# Patient Record
Sex: FEMALE | Race: WHITE | URBAN - METROPOLITAN AREA
[De-identification: names, ages, dates, MRNs, and addresses within clinical notes are randomized per-mention and may not be internally consistent; named-entity substitution may affect disease eponyms.]

---

## 2017-12-19 ENCOUNTER — INPATIENT (INPATIENT)
Facility: HOSPITAL | Age: 57
LOS: 2 days | Discharge: HOME | DRG: 445 | End: 2017-12-22
Attending: SURGERY | Admitting: SURGERY
Payer: COMMERCIAL

## 2017-12-19 VITALS
OXYGEN SATURATION: 96 % | DIASTOLIC BLOOD PRESSURE: 75 MMHG | HEART RATE: 74 BPM | HEIGHT: 62 IN | WEIGHT: 259.93 LBS | TEMPERATURE: 99 F | SYSTOLIC BLOOD PRESSURE: 142 MMHG | RESPIRATION RATE: 18 BRPM

## 2017-12-19 DIAGNOSIS — Z90.49 ACQUIRED ABSENCE OF OTHER SPECIFIED PARTS OF DIGESTIVE TRACT: Chronic | ICD-10-CM

## 2017-12-19 DIAGNOSIS — R11.2 NAUSEA WITH VOMITING, UNSPECIFIED: ICD-10-CM

## 2017-12-19 DIAGNOSIS — Z88.0 ALLERGY STATUS TO PENICILLIN: ICD-10-CM

## 2017-12-19 DIAGNOSIS — I25.2 OLD MYOCARDIAL INFARCTION: ICD-10-CM

## 2017-12-19 DIAGNOSIS — J98.11 ATELECTASIS: ICD-10-CM

## 2017-12-19 DIAGNOSIS — M17.0 BILATERAL PRIMARY OSTEOARTHRITIS OF KNEE: ICD-10-CM

## 2017-12-19 DIAGNOSIS — M79.7 FIBROMYALGIA: ICD-10-CM

## 2017-12-19 DIAGNOSIS — Z87.891 PERSONAL HISTORY OF NICOTINE DEPENDENCE: ICD-10-CM

## 2017-12-19 DIAGNOSIS — R79.89 OTHER SPECIFIED ABNORMAL FINDINGS OF BLOOD CHEMISTRY: ICD-10-CM

## 2017-12-19 DIAGNOSIS — R74.0 NONSPECIFIC ELEVATION OF LEVELS OF TRANSAMINASE AND LACTIC ACID DEHYDROGENASE [LDH]: ICD-10-CM

## 2017-12-19 DIAGNOSIS — E03.8 OTHER SPECIFIED HYPOTHYROIDISM: ICD-10-CM

## 2017-12-19 DIAGNOSIS — E03.9 HYPOTHYROIDISM, UNSPECIFIED: ICD-10-CM

## 2017-12-19 DIAGNOSIS — E66.01 MORBID (SEVERE) OBESITY DUE TO EXCESS CALORIES: ICD-10-CM

## 2017-12-19 DIAGNOSIS — B19.20 UNSPECIFIED VIRAL HEPATITIS C WITHOUT HEPATIC COMA: ICD-10-CM

## 2017-12-19 DIAGNOSIS — M06.9 RHEUMATOID ARTHRITIS, UNSPECIFIED: ICD-10-CM

## 2017-12-19 DIAGNOSIS — G56.00 CARPAL TUNNEL SYNDROME, UNSPECIFIED UPPER LIMB: ICD-10-CM

## 2017-12-19 DIAGNOSIS — M41.20 OTHER IDIOPATHIC SCOLIOSIS, SITE UNSPECIFIED: ICD-10-CM

## 2017-12-19 DIAGNOSIS — G62.9 POLYNEUROPATHY, UNSPECIFIED: ICD-10-CM

## 2017-12-19 DIAGNOSIS — N20.0 CALCULUS OF KIDNEY: ICD-10-CM

## 2017-12-19 DIAGNOSIS — R10.9 UNSPECIFIED ABDOMINAL PAIN: ICD-10-CM

## 2017-12-19 DIAGNOSIS — G43.909 MIGRAINE, UNSPECIFIED, NOT INTRACTABLE, WITHOUT STATUS MIGRAINOSUS: ICD-10-CM

## 2017-12-19 DIAGNOSIS — I25.10 ATHEROSCLEROTIC HEART DISEASE OF NATIVE CORONARY ARTERY WITHOUT ANGINA PECTORIS: ICD-10-CM

## 2017-12-19 DIAGNOSIS — K80.70 CALCULUS OF GALLBLADDER AND BILE DUCT WITHOUT CHOLECYSTITIS WITHOUT OBSTRUCTION: ICD-10-CM

## 2017-12-19 DIAGNOSIS — N13.2 HYDRONEPHROSIS WITH RENAL AND URETERAL CALCULOUS OBSTRUCTION: ICD-10-CM

## 2017-12-19 LAB
ALBUMIN SERPL ELPH-MCNC: 3.1 G/DL — LOW (ref 3.3–5)
ALP SERPL-CCNC: 593 U/L — HIGH (ref 40–120)
ALT FLD-CCNC: 374 U/L — HIGH (ref 12–78)
ANION GAP SERPL CALC-SCNC: 9 MMOL/L — SIGNIFICANT CHANGE UP (ref 5–17)
AST SERPL-CCNC: 758 U/L — HIGH (ref 15–37)
BASOPHILS # BLD AUTO: 0.1 K/UL — SIGNIFICANT CHANGE UP (ref 0–0.2)
BASOPHILS NFR BLD AUTO: 0.4 % — SIGNIFICANT CHANGE UP (ref 0–2)
BILIRUB SERPL-MCNC: 2.9 MG/DL — HIGH (ref 0.2–1.2)
BUN SERPL-MCNC: 22 MG/DL — SIGNIFICANT CHANGE UP (ref 7–23)
CALCIUM SERPL-MCNC: 9.5 MG/DL — SIGNIFICANT CHANGE UP (ref 8.5–10.1)
CHLORIDE SERPL-SCNC: 98 MMOL/L — SIGNIFICANT CHANGE UP (ref 96–108)
CO2 SERPL-SCNC: 27 MMOL/L — SIGNIFICANT CHANGE UP (ref 22–31)
CREAT SERPL-MCNC: 1.6 MG/DL — HIGH (ref 0.5–1.3)
EOSINOPHIL # BLD AUTO: 0 K/UL — SIGNIFICANT CHANGE UP (ref 0–0.5)
EOSINOPHIL NFR BLD AUTO: 0.3 % — SIGNIFICANT CHANGE UP (ref 0–6)
GLUCOSE SERPL-MCNC: 209 MG/DL — HIGH (ref 70–99)
HCG SERPL-ACNC: <1 MIU/ML — SIGNIFICANT CHANGE UP
HCT VFR BLD CALC: 43.4 % — SIGNIFICANT CHANGE UP (ref 34.5–45)
HGB BLD-MCNC: 13.7 G/DL — SIGNIFICANT CHANGE UP (ref 11.5–15.5)
LIDOCAIN IGE QN: 322 U/L — SIGNIFICANT CHANGE UP (ref 73–393)
LYMPHOCYTES # BLD AUTO: 0.8 K/UL — LOW (ref 1–3.3)
LYMPHOCYTES # BLD AUTO: 6.6 % — LOW (ref 13–44)
MCHC RBC-ENTMCNC: 30.3 PG — SIGNIFICANT CHANGE UP (ref 27–34)
MCHC RBC-ENTMCNC: 31.5 GM/DL — LOW (ref 32–36)
MCV RBC AUTO: 96 FL — SIGNIFICANT CHANGE UP (ref 80–100)
MONOCYTES # BLD AUTO: 0.5 K/UL — SIGNIFICANT CHANGE UP (ref 0–0.9)
MONOCYTES NFR BLD AUTO: 3.9 % — SIGNIFICANT CHANGE UP (ref 1–9)
NEUTROPHILS # BLD AUTO: 10.9 K/UL — HIGH (ref 1.8–7.4)
NEUTROPHILS NFR BLD AUTO: 88.8 % — HIGH (ref 43–77)
PLATELET # BLD AUTO: 276 K/UL — SIGNIFICANT CHANGE UP (ref 150–400)
POTASSIUM SERPL-MCNC: 3.9 MMOL/L — SIGNIFICANT CHANGE UP (ref 3.5–5.3)
POTASSIUM SERPL-SCNC: 3.9 MMOL/L — SIGNIFICANT CHANGE UP (ref 3.5–5.3)
PROT SERPL-MCNC: 8.6 G/DL — HIGH (ref 6–8.3)
RAPID RVP RESULT: SIGNIFICANT CHANGE UP
RBC # BLD: 4.53 M/UL — SIGNIFICANT CHANGE UP (ref 3.8–5.2)
RBC # FLD: 14.3 % — SIGNIFICANT CHANGE UP (ref 10.3–14.5)
SODIUM SERPL-SCNC: 134 MMOL/L — LOW (ref 135–145)
WBC # BLD: 12.3 K/UL — HIGH (ref 3.8–10.5)
WBC # FLD AUTO: 12.3 K/UL — HIGH (ref 3.8–10.5)

## 2017-12-19 PROCEDURE — 99285 EMERGENCY DEPT VISIT HI MDM: CPT

## 2017-12-19 PROCEDURE — 74176 CT ABD & PELVIS W/O CONTRAST: CPT | Mod: 26

## 2017-12-19 RX ORDER — ONDANSETRON 8 MG/1
4 TABLET, FILM COATED ORAL ONCE
Qty: 0 | Refills: 0 | Status: COMPLETED | OUTPATIENT
Start: 2017-12-19 | End: 2017-12-19

## 2017-12-19 RX ORDER — METRONIDAZOLE 500 MG
500 TABLET ORAL EVERY 8 HOURS
Qty: 0 | Refills: 0 | Status: DISCONTINUED | OUTPATIENT
Start: 2017-12-19 | End: 2017-12-22

## 2017-12-19 RX ORDER — CIPROFLOXACIN LACTATE 400MG/40ML
400 VIAL (ML) INTRAVENOUS EVERY 12 HOURS
Qty: 0 | Refills: 0 | Status: DISCONTINUED | OUTPATIENT
Start: 2017-12-20 | End: 2017-12-22

## 2017-12-19 RX ORDER — KETOROLAC TROMETHAMINE 30 MG/ML
30 SYRINGE (ML) INJECTION ONCE
Qty: 0 | Refills: 0 | Status: DISCONTINUED | OUTPATIENT
Start: 2017-12-19 | End: 2017-12-19

## 2017-12-19 RX ORDER — OXYCODONE HYDROCHLORIDE 5 MG/1
30 TABLET ORAL EVERY 4 HOURS
Qty: 0 | Refills: 0 | Status: DISCONTINUED | OUTPATIENT
Start: 2017-12-19 | End: 2017-12-19

## 2017-12-19 RX ORDER — FOLIC ACID 0.8 MG
0 TABLET ORAL
Qty: 0 | Refills: 0 | COMMUNITY

## 2017-12-19 RX ORDER — INFLUENZA VIRUS VACCINE 15; 15; 15; 15 UG/.5ML; UG/.5ML; UG/.5ML; UG/.5ML
0.5 SUSPENSION INTRAMUSCULAR ONCE
Qty: 0 | Refills: 0 | Status: DISCONTINUED | OUTPATIENT
Start: 2017-12-19 | End: 2017-12-22

## 2017-12-19 RX ORDER — ENOXAPARIN SODIUM 100 MG/ML
40 INJECTION SUBCUTANEOUS DAILY
Qty: 0 | Refills: 0 | Status: DISCONTINUED | OUTPATIENT
Start: 2017-12-19 | End: 2017-12-22

## 2017-12-19 RX ORDER — LANOLIN ALCOHOL/MO/W.PET/CERES
3 CREAM (GRAM) TOPICAL ONCE
Qty: 0 | Refills: 0 | Status: COMPLETED | OUTPATIENT
Start: 2017-12-19 | End: 2017-12-19

## 2017-12-19 RX ORDER — PANTOPRAZOLE SODIUM 20 MG/1
40 TABLET, DELAYED RELEASE ORAL ONCE
Qty: 0 | Refills: 0 | Status: COMPLETED | OUTPATIENT
Start: 2017-12-19 | End: 2017-12-19

## 2017-12-19 RX ORDER — SODIUM CHLORIDE 9 MG/ML
1000 INJECTION, SOLUTION INTRAVENOUS
Qty: 0 | Refills: 0 | Status: DISCONTINUED | OUTPATIENT
Start: 2017-12-19 | End: 2017-12-22

## 2017-12-19 RX ORDER — LEVOTHYROXINE SODIUM 125 MCG
200 TABLET ORAL DAILY
Qty: 0 | Refills: 0 | Status: DISCONTINUED | OUTPATIENT
Start: 2017-12-19 | End: 2017-12-22

## 2017-12-19 RX ORDER — SODIUM CHLORIDE 9 MG/ML
1000 INJECTION INTRAMUSCULAR; INTRAVENOUS; SUBCUTANEOUS ONCE
Qty: 0 | Refills: 0 | Status: COMPLETED | OUTPATIENT
Start: 2017-12-19 | End: 2017-12-19

## 2017-12-19 RX ORDER — CIPROFLOXACIN LACTATE 400MG/40ML
400 VIAL (ML) INTRAVENOUS ONCE
Qty: 0 | Refills: 0 | Status: COMPLETED | OUTPATIENT
Start: 2017-12-19 | End: 2017-12-19

## 2017-12-19 RX ORDER — HYDROMORPHONE HYDROCHLORIDE 2 MG/ML
1 INJECTION INTRAMUSCULAR; INTRAVENOUS; SUBCUTANEOUS
Qty: 0 | Refills: 0 | Status: DISCONTINUED | OUTPATIENT
Start: 2017-12-19 | End: 2017-12-22

## 2017-12-19 RX ORDER — MORPHINE SULFATE 50 MG/1
4 CAPSULE, EXTENDED RELEASE ORAL ONCE
Qty: 0 | Refills: 0 | Status: DISCONTINUED | OUTPATIENT
Start: 2017-12-19 | End: 2017-12-19

## 2017-12-19 RX ORDER — ACETAMINOPHEN 500 MG
650 TABLET ORAL EVERY 6 HOURS
Qty: 0 | Refills: 0 | Status: DISCONTINUED | OUTPATIENT
Start: 2017-12-19 | End: 2017-12-19

## 2017-12-19 RX ORDER — ONDANSETRON 8 MG/1
4 TABLET, FILM COATED ORAL EVERY 6 HOURS
Qty: 0 | Refills: 0 | Status: DISCONTINUED | OUTPATIENT
Start: 2017-12-19 | End: 2017-12-22

## 2017-12-19 RX ORDER — PREDNISOLONE 5 MG
0 TABLET ORAL
Qty: 0 | Refills: 0 | COMMUNITY

## 2017-12-19 RX ORDER — OXYCODONE HYDROCHLORIDE 5 MG/1
0 TABLET ORAL
Qty: 0 | Refills: 0 | COMMUNITY

## 2017-12-19 RX ORDER — TAMSULOSIN HYDROCHLORIDE 0.4 MG/1
0.4 CAPSULE ORAL AT BEDTIME
Qty: 0 | Refills: 0 | Status: DISCONTINUED | OUTPATIENT
Start: 2017-12-19 | End: 2017-12-22

## 2017-12-19 RX ORDER — KETOROLAC TROMETHAMINE 30 MG/ML
30 SYRINGE (ML) INJECTION EVERY 8 HOURS
Qty: 0 | Refills: 0 | Status: DISCONTINUED | OUTPATIENT
Start: 2017-12-19 | End: 2017-12-22

## 2017-12-19 RX ORDER — METHOTREXATE 2.5 MG/1
0 TABLET ORAL
Qty: 0 | Refills: 0 | COMMUNITY

## 2017-12-19 RX ORDER — OXYCODONE HYDROCHLORIDE 5 MG/1
30 TABLET ORAL EVERY 4 HOURS
Qty: 0 | Refills: 0 | Status: DISCONTINUED | OUTPATIENT
Start: 2017-12-19 | End: 2017-12-22

## 2017-12-19 RX ORDER — LEVOTHYROXINE SODIUM 125 MCG
0 TABLET ORAL
Qty: 0 | Refills: 0 | COMMUNITY

## 2017-12-19 RX ORDER — CIPROFLOXACIN LACTATE 400MG/40ML
VIAL (ML) INTRAVENOUS
Qty: 0 | Refills: 0 | Status: DISCONTINUED | OUTPATIENT
Start: 2017-12-19 | End: 2017-12-22

## 2017-12-19 RX ADMIN — Medication 3 MILLIGRAM(S): at 23:13

## 2017-12-19 RX ADMIN — HYDROMORPHONE HYDROCHLORIDE 1 MILLIGRAM(S): 2 INJECTION INTRAMUSCULAR; INTRAVENOUS; SUBCUTANEOUS at 16:43

## 2017-12-19 RX ADMIN — MORPHINE SULFATE 4 MILLIGRAM(S): 50 CAPSULE, EXTENDED RELEASE ORAL at 11:54

## 2017-12-19 RX ADMIN — SODIUM CHLORIDE 1000 MILLILITER(S): 9 INJECTION INTRAMUSCULAR; INTRAVENOUS; SUBCUTANEOUS at 14:00

## 2017-12-19 RX ADMIN — Medication 30 MILLIGRAM(S): at 11:54

## 2017-12-19 RX ADMIN — Medication 100 MILLIGRAM(S): at 22:36

## 2017-12-19 RX ADMIN — Medication 5 MILLIGRAM(S): at 16:44

## 2017-12-19 RX ADMIN — Medication 200 MILLIGRAM(S): at 16:44

## 2017-12-19 RX ADMIN — HYDROMORPHONE HYDROCHLORIDE 1 MILLIGRAM(S): 2 INJECTION INTRAMUSCULAR; INTRAVENOUS; SUBCUTANEOUS at 18:49

## 2017-12-19 RX ADMIN — Medication 30 MILLIGRAM(S): at 15:25

## 2017-12-19 RX ADMIN — Medication 200 MICROGRAM(S): at 16:44

## 2017-12-19 RX ADMIN — MORPHINE SULFATE 4 MILLIGRAM(S): 50 CAPSULE, EXTENDED RELEASE ORAL at 15:25

## 2017-12-19 RX ADMIN — PANTOPRAZOLE SODIUM 40 MILLIGRAM(S): 20 TABLET, DELAYED RELEASE ORAL at 22:35

## 2017-12-19 RX ADMIN — Medication 30 MILLIGRAM(S): at 22:35

## 2017-12-19 RX ADMIN — SODIUM CHLORIDE 1000 MILLILITER(S): 9 INJECTION INTRAMUSCULAR; INTRAVENOUS; SUBCUTANEOUS at 11:54

## 2017-12-19 RX ADMIN — Medication 30 MILLIGRAM(S): at 23:13

## 2017-12-19 RX ADMIN — SODIUM CHLORIDE 125 MILLILITER(S): 9 INJECTION, SOLUTION INTRAVENOUS at 18:50

## 2017-12-19 RX ADMIN — TAMSULOSIN HYDROCHLORIDE 0.4 MILLIGRAM(S): 0.4 CAPSULE ORAL at 22:37

## 2017-12-19 RX ADMIN — ONDANSETRON 4 MILLIGRAM(S): 8 TABLET, FILM COATED ORAL at 11:58

## 2017-12-19 RX ADMIN — HYDROMORPHONE HYDROCHLORIDE 1 MILLIGRAM(S): 2 INJECTION INTRAMUSCULAR; INTRAVENOUS; SUBCUTANEOUS at 23:13

## 2017-12-19 RX ADMIN — ENOXAPARIN SODIUM 40 MILLIGRAM(S): 100 INJECTION SUBCUTANEOUS at 16:44

## 2017-12-19 NOTE — ED ADULT NURSE NOTE - PMH
Carpal tunnel syndrome    Fibromyalgia    Hypothyroid    Kidney stones    MI (myocardial infarction)    Migraine    Neuropathy    Osteoarthritis    Rheumatoid arthritis    Scoliosis

## 2017-12-19 NOTE — CONSULT NOTE ADULT - PROBLEM SELECTOR RECOMMENDATION 2
nephrolithiasis hx  hydro on left   will see pt  pain control  monitor I and O  KARINA  on IVF  monitor vs and HD  monitor labs
5 mm stone the lower left ureter causing hydronephrosis  recommend Urology evaluation

## 2017-12-19 NOTE — ED PROVIDER NOTE - PROGRESS NOTE DETAILS
d/w Dr. Santiago GI, who recommends [facility]  Thomas Sauceda in ED to evaluate patient and will admit

## 2017-12-19 NOTE — CONSULT NOTE ADULT - SUBJECTIVE AND OBJECTIVE BOX
Date/Time Patient Seen:  		  Referring MD:   Data Reviewed	       Patient is a 57y old  Female who presents with a chief complaint of R flank pain (19 Dec 2017 14:46)      Subjective/HPI     pt in bed  seen and examined  vs and meds reviewed  known to Dr. Montes  has recent surgery for GB resection, has extensive hx of nephrolithiasis, and stenting  pt is now c/o abd pain   alert  verbal  able to move all extr    57 y.o. F with Morbid obesity and chronic arthritis on high dose narcotics, who had a lap alize  6 months ago, presented to the ER today c/o RUQ/R flank pain which began overnight with N/V.  She had eaten cauliflower the night before.  She denies change in bowel habits.  She denies fever/chills.  She does admit to sore throat/nasal congestion for the past few days.  Now she also admits to some L flank pain.    58 yo female with a past medical history significant for RA, CAD/MI, kidney stones, fibromyalgia, RLS who presents complaining of RLQ pain which radiates to the right flank x 1 day. Pt admits to nausea and 1 episode of vomiting. Pt is s/p a cholecystectomy      PAST MEDICAL & SURGICAL HISTORY:  Kidney stones  Carpal tunnel syndrome  Neuropathy  Scoliosis  Migraine  Hypothyroid  Fibromyalgia  MI (myocardial infarction)  Osteoarthritis  Rheumatoid arthritis  History of laparoscopic cholecystectomy        Medication list         MEDICATIONS  (STANDING):  ciprofloxacin   IVPB      enoxaparin Injectable 40 milliGRAM(s) SubCutaneous daily  lactated ringers. 1000 milliLiter(s) (125 mL/Hr) IV Continuous <Continuous>  levothyroxine 200 MICROGram(s) Oral daily  metroNIDAZOLE  IVPB 500 milliGRAM(s) IV Intermittent every 8 hours  predniSONE   Tablet 5 milliGRAM(s) Oral daily    MEDICATIONS  (PRN):  HYDROmorphone  Injectable 1 milliGRAM(s) IV Push every 3 hours PRN Severe Pain (7 - 10)  ketorolac   Injectable 30 milliGRAM(s) IV Push every 8 hours PRN Moderate Pain (4 - 6)  ondansetron Injectable 4 milliGRAM(s) IV Push every 6 hours PRN Nausea and/or Vomiting  oxyCODONE    IR 30 milliGRAM(s) Oral every 4 hours PRN Moderate Pain (4 - 6)         Vitals log        ICU Vital Signs Last 24 Hrs  T(C): 37 (19 Dec 2017 11:45), Max: 37.1 (19 Dec 2017 10:28)  T(F): 98.6 (19 Dec 2017 11:45), Max: 98.8 (19 Dec 2017 10:28)  HR: 91 (19 Dec 2017 14:54) (74 - 91)  BP: 132/80 (19 Dec 2017 14:54) (132/80 - 142/75)  BP(mean): --  ABP: --  ABP(mean): --  RR: 18 (19 Dec 2017 10:28) (18 - 18)  SpO2: 96% (19 Dec 2017 10:28) (96% - 96%)           Input and Output:  I&O's Detail      Lab Data                        13.7   12.3  )-----------( 276      ( 19 Dec 2017 12:04 )             43.4     12-19    134<L>  |  98  |  22  ----------------------------<  209<H>  3.9   |  27  |  1.60<H>    Ca    9.5      19 Dec 2017 12:04    TPro  8.6<H>  /  Alb  3.1<L>  /  TBili  2.9<H>  /  DBili  x   /  AST  758<H>  /  ALT  374<H>  /  AlkPhos  593<H>  12-19      ex smoker    not working  lives with family  no sick contacts        Review of Systems	  abd pain    Objective     Physical Examination  morbid obesity  head at  heart s1s2  lungs dec BS  cn grossly int  moves all extr        Pertinent Lab findings & Imaging      Catalan:  NO   Adequate UO     I&O's Detail           Discussed with:     Cultures:	        Radiology      [facility]                            PROCEDURE DATE:  12/19/2017          INTERPRETATION:  CT abdomen and pelvis, renal stone technique. No oral or   IV dye given. Patient has flank pain and history of cholecystectomy.    The lower heart is normal in size. There are slight valvular   calcifications. There are some coronary calcifications. There is a   minimal linear scar or atelectatic area at right base.    In the abdomen there is diffuse fatty infiltration of the liver. Clips in   the gallbladder area noted. The spleen and pancreas are unremarkable   given limitations of non-IV contrast technique.    No adrenal enlargements are evident.    The kidneys are essentially normal in size, shape, and axis. There are   several right renal stones. The largest is approximately 8.4 x 5 mm. This   stone may be 2 adjacent stones.    The right kidney does not show hydronephrosis and there is no right   hydroureter. There is left-sided hydronephrosis and hydroureter to a mild   to moderate degree. There is a 5 mm stone in the lower left ureter   accounting for the hydronephrosis. Bladder is grossly normal.    There are some calcification in the wall of the aorta. No celiac or   periaortic adenopathy is evident.    CT pelvis.    There is a periumbilical hernia containing fat. Uterus is grossly normal.   No iliac or inguinal adenopathy is evident.    There is no bowel obstruction.    There is no sense of mass or infiltration relevant to the colon. The   appendix is unremarkable.    Spinal degeneration is concentrated at the lower 2 levels with vacuum   formation and some loss of disc height.    IMPRESSION: 5 mm stone the lower left ureter causing hydronephrosis.    There are incidental scattered small right renal stones. There has been   cholecystectomy. There is fatty infiltration of the liver and a   periumbilical hernia containing fat. Incidental findings as above.                KAMLESH LORENZO M.D., ATTENDING RADIOLOGIST  This document has been electronically signed. Dec 19 2017 12:23PM

## 2017-12-19 NOTE — CONSULT NOTE ADULT - SUBJECTIVE AND OBJECTIVE BOX
Chief Complaint:  Patient is a 57y old  Female who presents with a chief complaint of     HPI:This is a 56 yo female with a past medical history significant for RA, CAD/MI, kidney stones, fibromyalgia, RLS who presents complaining of RLQ pain which radiates to the right flank x 1 day. Pt admits to nausea and 1 episode of vomiting. Pt is s/p a cholecystectomy in    Allergies:  penicillin (Unknown)      Medications:  sodium chloride 0.9% Bolus 1000 milliLiter(s) IV Bolus once      PMHX/PSHX:  Kidney stones  Carpal tunnel syndrome  Neuropathy  Scoliosis  Migraine  Hypothyroid  Fibromyalgia  MI (myocardial infarction)  Osteoarthritis  Rheumatoid arthritis      Family history:  nc    Social History: pt denies etoh, tobacco and illicit drug use     ROS:     General:  No wt loss, fevers, chills, night sweats, fatigue,   Eyes:  Good vision, no reported pain  ENT:  No sore throat, pain, runny nose, dysphagia  CV:  No pain, palpitations, hypo/hypertension  Resp:  No dyspnea, cough, tachypnea, wheezing  GI:  + RLQ pain which radiates to flank, + nausea, + vomiting.   :  No pain, bleeding, incontinence, nocturia  Muscle:  No pain, weakness  Neuro:  No weakness, tingling, memory problems  Psych:  No fatigue, insomnia, mood problems, depression  Endocrine:  No polyuria, polydipsia, cold/heat intolerance  Heme:  No petechiae, ecchymosis, easy bruisability  Skin:  No rash, tattoos, scars, edema      PHYSICAL EXAM:   Vital Signs:  Vital Signs Last 24 Hrs  T(C): 37 (19 Dec 2017 11:45), Max: 37.1 (19 Dec 2017 10:28)  T(F): 98.6 (19 Dec 2017 11:45), Max: 98.8 (19 Dec 2017 10:28)  HR: 80 (19 Dec 2017 11:45) (74 - 80)  BP: 138/64 (19 Dec 2017 11:45) (138/64 - 142/75)  BP(mean): --  RR: 18 (19 Dec 2017 10:28) (18 - 18)  SpO2: 96% (19 Dec 2017 10:28) (96% - 96%)  Daily Height in cm: 157.48 (19 Dec 2017 10:28)    Daily     GENERAL:  Appears stated age, well-groomed, well-nourished, no distress  HEENT:  NC/AT,  conjunctivae clear and pink, no thyromegaly, nodules, adenopathy, no JVD, sclera -anicteric  CHEST:  Full & symmetric excursion, no increased effort, breath sounds clear  HEART:  Regular rhythm, S1, S2, no murmur/rub/S3/S4, no abdominal bruit, no edema  ABDOMEN:  Soft, RLQ tenderness, non-distended, normoactive bowel sounds,  no masses ,no hepato-splenomegaly, no signs of chronic liver disease  EXTREMITIES  no cyanosis,clubbing or edema  SKIN:  No rash/erythema/ecchymoses/petechiae/wounds/abscess/warm/dry  NEURO:  Alert, oriented, no asterixis, no tremor, no encephalopathy    LABS:                        13.7   12.3  )-----------( 276      ( 19 Dec 2017 12:04 )             43.4     12-19    134<L>  |  98  |  22  ----------------------------<  209<H>  3.9   |  27  |  1.60<H>    Ca    9.5      19 Dec 2017 12:04    TPro  8.6<H>  /  Alb  3.1<L>  /  TBili  2.9<H>  /  DBili  x   /  AST  758<H>  /  ALT  374<H>  /  AlkPhos  593<H>  12-19    LIVER FUNCTIONS - ( 19 Dec 2017 12:04 )  Alb: 3.1 g/dL / Pro: 8.6 g/dL / ALK PHOS: 593 U/L / ALT: 374 U/L / AST: 758 U/L / GGT: x               Amylase Serum--      Lipase [id number]       Ammonia--      Imaging:

## 2017-12-19 NOTE — CONSULT NOTE ADULT - ASSESSMENT
57 year old female who presents with RLQ abdominal pain radiating to the flank, nausea and vomiting.

## 2017-12-19 NOTE — ED ADULT NURSE NOTE - OBJECTIVE STATEMENT
Pt received in bed alert and oriented with c/o reoccurring right flank pain. As per Md's orders IV annetta placed blood specimen obtained and sent to the lab. Pt stable and meds given and tolerated well. Pt stable and nursing care ongoing and safety maintained.

## 2017-12-19 NOTE — CONSULT NOTE ADULT - SUBJECTIVE AND OBJECTIVE BOX
CHIEF COMPLAINT:  57y Female with chief complaint of          HISTORY OF PRESENT ILLNESS:  57 y.o. F with Morbid obesity and chronic arthritis on high dose narcotics, who I did a lap alize on 6 months ago, presented to the ER today c/o RUQ/R flank pain which began overnight with N/V and frequency to void which now resolved .  She had eaten cauliflower the night before.  She denies change in bowel habits.  She denies fever/chills.  She does admit to sore throat/nasal congestion for the past few days.  Now she also admits to some L flank pain.      *************************************************************************************************  PAST MEDICAL & SURGICAL HISTORY:  Kidney stones  Carpal tunnel syndrome  Neuropathy  Scoliosis  Migraine  Hypothyroid  Fibromyalgia  MI (myocardial infarction)  Osteoarthritis  Rheumatoid arthritis  History of laparoscopic cholecystectomy      MEDICATIONS  (STANDING):  ciprofloxacin   IVPB      enoxaparin Injectable 40 milliGRAM(s) SubCutaneous daily  lactated ringers. 1000 milliLiter(s) (125 mL/Hr) IV Continuous <Continuous>  levothyroxine 200 MICROGram(s) Oral daily  metroNIDAZOLE  IVPB 500 milliGRAM(s) IV Intermittent every 8 hours  predniSONE   Tablet 5 milliGRAM(s) Oral daily    MEDICATIONS  (PRN):  HYDROmorphone  Injectable 1 milliGRAM(s) IV Push every 3 hours PRN Severe Pain (7 - 10)  ketorolac   Injectable 30 milliGRAM(s) IV Push every 8 hours PRN Moderate Pain (4 - 6)  ondansetron Injectable 4 milliGRAM(s) IV Push every 6 hours PRN Nausea and/or Vomiting  oxyCODONE    IR 30 milliGRAM(s) Oral every 4 hours PRN Moderate Pain (4 - 6)      ALLERGIES:  penicillin (Unknown)      SOCIAL HISTORY:          ETOH:                                  Smoking:                                   Drugs:                                         Occupation:    FAMILY HISTORY:  No pertinent family history in first degree relatives      CONSTITUTIONAL: No weakness, fevers or chills    EYES/ENT: No visual changes;  No vertigo or throat pain     NECK: No pain or stiffness    RESPIRATORY: No cough, wheezing, hemoptysis; No shortness of breath    CARDIOVASCULAR: No chest pain or palpitations    GASTROINTESTINAL: No abdominal or epigastric pain. No nausea, vomiting, or hematemesis; No diarrhea or constipation. No melena or hematochezia.    GENITOURINARY: hx stones   recent frequency that resolved  hx stents with urosepsis  and stones    NEUROLOGICAL: No numbness or weakness    SKIN: No itching, burning, rashes, or lesions     All other review of systems is negative unless indicated above.    ****************************************************************************************************  PHYSICAL EXAM:    Vital Signs Last 24 Hrs  T(C): 37 (19 Dec 2017 11:45), Max: 37.1 (19 Dec 2017 10:28)  T(F): 98.6 (19 Dec 2017 11:45), Max: 98.8 (19 Dec 2017 10:28)  HR: 91 (19 Dec 2017 14:54) (74 - 91)  BP: 132/80 (19 Dec 2017 14:54) (132/80 - 142/75)  BP(mean): --  RR: 18 (19 Dec 2017 10:28) (18 - 18)  SpO2: 96% (19 Dec 2017 10:28) (96% - 96%)    GENERAL: alert     ABDOMEN: soft  non tender     BACK: no cva tenderness    LOWER EXTREMITIES:    NEUROLOGICAL:      *******************************************************************************************************  LABS:                        13.7   12.3  )-----------( 276      ( 19 Dec 2017 12:04 )             43.4     12-19    134<L>  |  98  |  22  ----------------------------<  209<H>  3.9   |  27  |  1.60<H>    Ca    9.5      19 Dec 2017 12:04    TPro  8.6<H>  /  Alb  3.1<L>  /  TBili  2.9<H>  /  DBili  x   /  AST  758<H>  /  ALT  374<H>  /  AlkPhos  593<H>  12-19        Urine Culture:     RADIOLOGY & ADDITIONAL STUDIES:    INTERPRETATION:  CT abdomen and pelvis, renal stone technique. No oral or   IV dye given. Patient has flank pain and history of cholecystectomy.    The lowerheart is normal in size. There are slight valvular   calcifications. There are some coronary calcifications. There is a   minimal linear scar or atelectatic area at right base.    In the abdomen there is diffuse fatty infiltration of the liver. Clipsin   the gallbladder area noted. The spleen and pancreas are unremarkable   given limitations of non-IV contrast technique.    No adrenal enlargements are evident.    The kidneys are essentially normal in size, shape, and axis. There are   several right renal stones. The largest is approximately 8.4 x 5 mm. This   stone may be 2 adjacent stones.    The right kidney does not show hydronephrosis and there is no right   hydroureter. There is left-sided hydronephrosis and hydroureter to a mild   to moderate degree. There is a 5 mm stone in the lower left ureter   accounting for the hydronephrosis. Bladder is grossly normal.    There are some calcification in the wall of the aorta. No celiac or   periaortic adenopathy is evident.    CT pelvis.    There is a periumbilical hernia containing fat. Uterus is grossly normal.   No iliac or inguinal adenopathy is evident.    There is no bowel obstruction.    There is no sense of mass or infiltration relevant to the colon. The   appendix is unremarkable.    Spinal degeneration is concentrated at the lower 2 levels with vacuum   formation and some loss of disc height.    IMPRESSION: 5 mm stone the lower left ureter causing hydronephrosis.    There are incidental scattered small right renal stones. There has been   cholecystectomy. There is fatty infiltration of the liver and a   periumbilical hernia containing fat. Incidental findings as above.        *********************************************************************************************************  IMPRESSION: possible passed a right ureteral stone could explain the onset of acute rt sided pain and frequency and nausea  5mm distal left ureteral stone now seen with little to no discomfort on left    PLAN:  flomax   strain urine

## 2017-12-19 NOTE — H&P ADULT - HISTORY OF PRESENT ILLNESS
57 y.o. F with Morbid obesity and chronic arthritis on high dose narcotics, who I did a lap alize on 6 months ago, presented to the ER today c/o RUQ/R flank pain which began overnight with N/V.  She had eaten cauliflower the night before.  She denies change in bowel habits.  She denies fever/chills.  She does admit to sore throat/nasal congestion for the past few days.  Now she also admits to some L flank pain.

## 2017-12-19 NOTE — ED ADULT NURSE NOTE - ED STAT RN HANDOFF DETAILS
Pt stable and resting in bed. Meds given and tolerated well. Pt now admitted and verbal report given to CAROLE Santamaria for continuum of care. pt's safety maintained.

## 2017-12-19 NOTE — CONSULT NOTE ADULT - PROBLEM SELECTOR RECOMMENDATION 6
morbid obesity  may have KAYLIN  may have OHS  will need supp o2 at night time  keep sat > 88 pct  will need outpatient work up for KAYLIN

## 2017-12-19 NOTE — CONSULT NOTE ADULT - PROBLEM SELECTOR RECOMMENDATION 5
abd pain  on oxycodone at home as needed  Dilaudid prn ordered   will add Toradol PRN   emotional support

## 2017-12-19 NOTE — H&P ADULT - NSHPPHYSICALEXAM_GEN_ALL_CORE
Vital Signs Last 24 Hrs  T(C): 37 (19 Dec 2017 11:45), Max: 37.1 (19 Dec 2017 10:28)  T(F): 98.6 (19 Dec 2017 11:45), Max: 98.8 (19 Dec 2017 10:28)  HR: 80 (19 Dec 2017 11:45) (74 - 80)  BP: 138/64 (19 Dec 2017 11:45) (138/64 - 142/75)  BP(mean): --  RR: 18 (19 Dec 2017 10:28) (18 - 18)  SpO2: 96% (19 Dec 2017 10:28) (96% - 96%)    Physical Exam:  General: AAOx3; Comfortable; Morbidly obese  HEENT: + jaundice and icterus; No cervical adenopathy  Lungs: BCTA  Heart: RRR  Abd: Soft, ND, NT; Small reducible umbilical hernia; Well healed port site scars; Normal bowel sounds  Back: No CVA tenderness  Extremities: No edema or calf tenderness; Good pulses b/l

## 2017-12-19 NOTE — CONSULT NOTE ADULT - PROBLEM SELECTOR RECOMMENDATION 9
GI eval noted  may need ERCP  recent cholecystectomy   eval for retained Gallstone
Elevated transaminases  MRCP ordered to r/o obstruction, ? possible passed stone   may require ERCP pending above results  monitor LFTs daily  avoid hepatotoxic agents  keep NPO, IVF  pain control

## 2017-12-19 NOTE — ED PROVIDER NOTE - OBJECTIVE STATEMENT
56 yo female with hx RA, CAD/MI, kidney stones, fibromylagia, RLS c//o right flank pain x 1 day. +nausea. Pain radiates to her back, constant pain. +sharp/crampy  patient vomited x 1; no diarrhea. denies any chest pain. c/o sob x 1 month  +urinary frequency    pmd: Alisia 58 yo female with hx RA, CAD/MI, kidney stones, fibromylagia, RLS c//o right flank pain x 1 day. +nausea. Pain radiates to her back, constant pain. +sharp/crampy  patient vomited x 1; no diarrhea. denies any chest pain. c/o sob x 1 month  +urinary frequency. Patient s/p cholecystectomy in 6/17 by Dr. Romo    pmd: Alisia

## 2017-12-19 NOTE — H&P ADULT - NSHPLABSRESULTS_GEN_ALL_CORE
LABS:  CBC Full  -  ( 19 Dec 2017 12:04 )  WBC Count : 12.3 K/uL  Hemoglobin : 13.7 g/dL  Hematocrit : 43.4 %  Platelet Count - Automated : 276 K/uL  Mean Cell Volume : 96.0 fl  Mean Cell Hemoglobin : 30.3 pg  Mean Cell Hemoglobin Concentration : 31.5 gm/dL  Auto Neutrophil # : 10.9 K/uL  Auto Lymphocyte # : 0.8 K/uL  Auto Monocyte # : 0.5 K/uL  Auto Eosinophil # : 0.0 K/uL  Auto Basophil # : 0.1 K/uL  Auto Neutrophil % : 88.8 %  Auto Lymphocyte % : 6.6 %  Auto Monocyte % : 3.9 %  Auto Eosinophil % : 0.3 %  Auto Basophil % : 0.4 %    12-19    134<L>  |  98  |  22  ----------------------------<  209<H>  3.9   |  27  |  1.60<H>    Ca    9.5      19 Dec 2017 12:04    TPro  8.6<H>  /  Alb  3.1<L>  /  TBili  2.9<H>  /  DBili  x   /  AST  758<H>  /  ALT  374<H>  /  AlkPhos  593<H>  12-19    LIVER FUNCTIONS - ( 19 Dec 2017 12:04 )  Alb: 3.1 g/dL / Pro: 8.6 g/dL / ALK PHOS: 593 U/L / ALT: 374 U/L / AST: 758 U/L / GGT: x           RADIOLOGY & ADDITIONAL STUDIES:  < from: CT Renal Stone Hunt (12.19.17 @ 12:13) >      EXAM:  CT RENAL STONE HUNT                            PROCEDURE DATE:  12/19/2017          INTERPRETATION:  CT abdomen and pelvis, renal stone technique. No oral or   IV dye given. Patient has flank pain and history of cholecystectomy.    The lowerheart is normal in size. There are slight valvular   calcifications. There are some coronary calcifications. There is a   minimal linear scar or atelectatic area at right base.    In the abdomen there is diffuse fatty infiltration of the liver. Clipsin   the gallbladder area noted. The spleen and pancreas are unremarkable   given limitations of non-IV contrast technique.    No adrenal enlargements are evident.    The kidneys are essentially normal in size, shape, and axis. There are   several right renal stones. The largest is approximately 8.4 x 5 mm. This   stone may be 2 adjacent stones.    The right kidney does not show hydronephrosis and there is no right   hydroureter. There is left-sided hydronephrosis and hydroureter to a mild   to moderate degree. There is a 5 mm stone in the lower left ureter   accounting for the hydronephrosis. Bladder is grossly normal.    There are some calcification in the wall of the aorta. No celiac or   periaortic adenopathy is evident.    CT pelvis.    There is a periumbilical hernia containing fat. Uterus is grossly normal.   No iliac or inguinal adenopathy is evident.    There is no bowel obstruction.    There is no sense of mass or infiltration relevant to the colon. The   appendix is unremarkable.    Spinal degeneration is concentrated at the lower 2 levels with vacuum   formation and some loss of disc height.    IMPRESSION: 5 mm stone the lower left ureter causing hydronephrosis.    There are incidental scattered small right renal stones. There has been   cholecystectomy. There is fatty infiltration of the liver and a   periumbilical hernia containing fat. Incidental findings as above.        KAMLESH LORENZO M.D., ATTENDING RADIOLOGIST  This document has been electronically signed. Dec 19 2017 12:23PM    I reviewed the CT myself and agree with the report

## 2017-12-20 ENCOUNTER — TRANSCRIPTION ENCOUNTER (OUTPATIENT)
Age: 57
End: 2017-12-20

## 2017-12-20 DIAGNOSIS — K80.50 CALCULUS OF BILE DUCT WITHOUT CHOLANGITIS OR CHOLECYSTITIS WITHOUT OBSTRUCTION: ICD-10-CM

## 2017-12-20 DIAGNOSIS — E03.9 HYPOTHYROIDISM, UNSPECIFIED: ICD-10-CM

## 2017-12-20 DIAGNOSIS — M17.0 BILATERAL PRIMARY OSTEOARTHRITIS OF KNEE: ICD-10-CM

## 2017-12-20 DIAGNOSIS — I25.10 ATHEROSCLEROTIC HEART DISEASE OF NATIVE CORONARY ARTERY WITHOUT ANGINA PECTORIS: ICD-10-CM

## 2017-12-20 LAB
ALBUMIN SERPL ELPH-MCNC: 2.7 G/DL — LOW (ref 3.3–5)
ALP SERPL-CCNC: 487 U/L — HIGH (ref 40–120)
ALT FLD-CCNC: 476 U/L — HIGH (ref 12–78)
ANION GAP SERPL CALC-SCNC: 5 MMOL/L — SIGNIFICANT CHANGE UP (ref 5–17)
APPEARANCE UR: CLEAR — SIGNIFICANT CHANGE UP
APTT BLD: 32.2 SEC — SIGNIFICANT CHANGE UP (ref 27.5–37.4)
AST SERPL-CCNC: 657 U/L — HIGH (ref 15–37)
BASOPHILS # BLD AUTO: 0 K/UL — SIGNIFICANT CHANGE UP (ref 0–0.2)
BASOPHILS NFR BLD AUTO: 0.5 % — SIGNIFICANT CHANGE UP (ref 0–2)
BILIRUB DIRECT SERPL-MCNC: 3.4 MG/DL — HIGH (ref 0.05–0.2)
BILIRUB INDIRECT FLD-MCNC: 0.8 MG/DL — SIGNIFICANT CHANGE UP (ref 0.2–1)
BILIRUB SERPL-MCNC: 4.2 MG/DL — HIGH (ref 0.2–1.2)
BILIRUB UR-MCNC: ABNORMAL
BUN SERPL-MCNC: 20 MG/DL — SIGNIFICANT CHANGE UP (ref 7–23)
CALCIUM SERPL-MCNC: 8.9 MG/DL — SIGNIFICANT CHANGE UP (ref 8.5–10.1)
CHLORIDE SERPL-SCNC: 103 MMOL/L — SIGNIFICANT CHANGE UP (ref 96–108)
CO2 SERPL-SCNC: 29 MMOL/L — SIGNIFICANT CHANGE UP (ref 22–31)
COLOR SPEC: YELLOW — SIGNIFICANT CHANGE UP
CREAT SERPL-MCNC: 1.3 MG/DL — SIGNIFICANT CHANGE UP (ref 0.5–1.3)
DIFF PNL FLD: ABNORMAL
EOSINOPHIL # BLD AUTO: 0.1 K/UL — SIGNIFICANT CHANGE UP (ref 0–0.5)
EOSINOPHIL NFR BLD AUTO: 1.4 % — SIGNIFICANT CHANGE UP (ref 0–6)
GLUCOSE SERPL-MCNC: 130 MG/DL — HIGH (ref 70–99)
GLUCOSE UR QL: NEGATIVE — SIGNIFICANT CHANGE UP
HAV IGM SER-ACNC: SIGNIFICANT CHANGE UP
HBV CORE IGM SER-ACNC: SIGNIFICANT CHANGE UP
HBV SURFACE AG SER-ACNC: SIGNIFICANT CHANGE UP
HCT VFR BLD CALC: 42 % — SIGNIFICANT CHANGE UP (ref 34.5–45)
HCV AB S/CO SERPL IA: 5.58 S/CO — SIGNIFICANT CHANGE UP
HCV AB SERPL-IMP: REACTIVE
HGB BLD-MCNC: 13.2 G/DL — SIGNIFICANT CHANGE UP (ref 11.5–15.5)
INR BLD: 1.08 RATIO — SIGNIFICANT CHANGE UP (ref 0.88–1.16)
KETONES UR-MCNC: NEGATIVE — SIGNIFICANT CHANGE UP
LEUKOCYTE ESTERASE UR-ACNC: ABNORMAL
LYMPHOCYTES # BLD AUTO: 1.9 K/UL — SIGNIFICANT CHANGE UP (ref 1–3.3)
LYMPHOCYTES # BLD AUTO: 23.2 % — SIGNIFICANT CHANGE UP (ref 13–44)
MAGNESIUM SERPL-MCNC: 2.1 MG/DL — SIGNIFICANT CHANGE UP (ref 1.6–2.6)
MCHC RBC-ENTMCNC: 30.7 PG — SIGNIFICANT CHANGE UP (ref 27–34)
MCHC RBC-ENTMCNC: 31.4 GM/DL — LOW (ref 32–36)
MCV RBC AUTO: 97.9 FL — SIGNIFICANT CHANGE UP (ref 80–100)
MONOCYTES # BLD AUTO: 0.3 K/UL — SIGNIFICANT CHANGE UP (ref 0–0.9)
MONOCYTES NFR BLD AUTO: 3.1 % — SIGNIFICANT CHANGE UP (ref 1–9)
NEUTROPHILS # BLD AUTO: 5.9 K/UL — SIGNIFICANT CHANGE UP (ref 1.8–7.4)
NEUTROPHILS NFR BLD AUTO: 71.8 % — SIGNIFICANT CHANGE UP (ref 43–77)
NITRITE UR-MCNC: NEGATIVE — SIGNIFICANT CHANGE UP
PH UR: 5 — SIGNIFICANT CHANGE UP (ref 5–8)
PLATELET # BLD AUTO: 262 K/UL — SIGNIFICANT CHANGE UP (ref 150–400)
POTASSIUM SERPL-MCNC: 3.7 MMOL/L — SIGNIFICANT CHANGE UP (ref 3.5–5.3)
POTASSIUM SERPL-SCNC: 3.7 MMOL/L — SIGNIFICANT CHANGE UP (ref 3.5–5.3)
PROT SERPL-MCNC: 8 G/DL — SIGNIFICANT CHANGE UP (ref 6–8.3)
PROT UR-MCNC: 25 MG/DL
PROTHROM AB SERPL-ACNC: 11.8 SEC — SIGNIFICANT CHANGE UP (ref 9.8–12.7)
RBC # BLD: 4.29 M/UL — SIGNIFICANT CHANGE UP (ref 3.8–5.2)
RBC # FLD: 14.2 % — SIGNIFICANT CHANGE UP (ref 10.3–14.5)
SODIUM SERPL-SCNC: 137 MMOL/L — SIGNIFICANT CHANGE UP (ref 135–145)
SP GR SPEC: 1 — LOW (ref 1.01–1.02)
UROBILINOGEN FLD QL: 8
WBC # BLD: 8.3 K/UL — SIGNIFICANT CHANGE UP (ref 3.8–10.5)
WBC # FLD AUTO: 8.3 K/UL — SIGNIFICANT CHANGE UP (ref 3.8–10.5)

## 2017-12-20 PROCEDURE — 93010 ELECTROCARDIOGRAM REPORT: CPT

## 2017-12-20 PROCEDURE — 74181 MRI ABDOMEN W/O CONTRAST: CPT | Mod: 26

## 2017-12-20 RX ORDER — TAMSULOSIN HYDROCHLORIDE 0.4 MG/1
1 CAPSULE ORAL
Qty: 30 | Refills: 0 | OUTPATIENT
Start: 2017-12-20

## 2017-12-20 RX ORDER — METFORMIN HYDROCHLORIDE 850 MG/1
0 TABLET ORAL
Qty: 0 | Refills: 0 | COMMUNITY

## 2017-12-20 RX ORDER — AMITRIPTYLINE HCL 25 MG
100 TABLET ORAL AT BEDTIME
Qty: 0 | Refills: 0 | Status: DISCONTINUED | OUTPATIENT
Start: 2017-12-20 | End: 2017-12-22

## 2017-12-20 RX ADMIN — SODIUM CHLORIDE 125 MILLILITER(S): 9 INJECTION, SOLUTION INTRAVENOUS at 17:33

## 2017-12-20 RX ADMIN — Medication 100 MILLIGRAM(S): at 13:43

## 2017-12-20 RX ADMIN — Medication 200 MILLIGRAM(S): at 05:49

## 2017-12-20 RX ADMIN — HYDROMORPHONE HYDROCHLORIDE 1 MILLIGRAM(S): 2 INJECTION INTRAMUSCULAR; INTRAVENOUS; SUBCUTANEOUS at 00:00

## 2017-12-20 RX ADMIN — ENOXAPARIN SODIUM 40 MILLIGRAM(S): 100 INJECTION SUBCUTANEOUS at 11:12

## 2017-12-20 RX ADMIN — Medication 30 MILLIGRAM(S): at 14:50

## 2017-12-20 RX ADMIN — HYDROMORPHONE HYDROCHLORIDE 1 MILLIGRAM(S): 2 INJECTION INTRAMUSCULAR; INTRAVENOUS; SUBCUTANEOUS at 22:38

## 2017-12-20 RX ADMIN — HYDROMORPHONE HYDROCHLORIDE 1 MILLIGRAM(S): 2 INJECTION INTRAMUSCULAR; INTRAVENOUS; SUBCUTANEOUS at 17:23

## 2017-12-20 RX ADMIN — Medication 30 MILLIGRAM(S): at 14:20

## 2017-12-20 RX ADMIN — Medication 100 MILLIGRAM(S): at 21:20

## 2017-12-20 RX ADMIN — HYDROMORPHONE HYDROCHLORIDE 1 MILLIGRAM(S): 2 INJECTION INTRAMUSCULAR; INTRAVENOUS; SUBCUTANEOUS at 23:10

## 2017-12-20 RX ADMIN — Medication 5 MILLIGRAM(S): at 05:50

## 2017-12-20 RX ADMIN — Medication 200 MICROGRAM(S): at 05:50

## 2017-12-20 RX ADMIN — TAMSULOSIN HYDROCHLORIDE 0.4 MILLIGRAM(S): 0.4 CAPSULE ORAL at 21:15

## 2017-12-20 RX ADMIN — HYDROMORPHONE HYDROCHLORIDE 1 MILLIGRAM(S): 2 INJECTION INTRAMUSCULAR; INTRAVENOUS; SUBCUTANEOUS at 17:47

## 2017-12-20 RX ADMIN — Medication 100 MILLIGRAM(S): at 05:47

## 2017-12-20 RX ADMIN — HYDROMORPHONE HYDROCHLORIDE 1 MILLIGRAM(S): 2 INJECTION INTRAMUSCULAR; INTRAVENOUS; SUBCUTANEOUS at 08:41

## 2017-12-20 RX ADMIN — Medication 100 MILLIGRAM(S): at 22:38

## 2017-12-20 RX ADMIN — Medication 200 MILLIGRAM(S): at 17:27

## 2017-12-20 NOTE — DISCHARGE NOTE ADULT - HOSPITAL COURSE
The patient was admitted, hydrated, given IV abx.  Urology consult done, recommended observation.  Bili increased  but other LFTs improved.  WBC normalized.  Pain resolved.  Diet tolerated The patient was admitted, hydrated, given IV abx.  Urology consult done, recommended observation.  Bili increased  but other LFTs improved.  WBC normalized.  Pain resolved.  Diet tolerated.  Underwent ERCP The patient was admitted, hydrated, given IV abx.  Urology consult done, recommended observation.  Bili increased  but other LFTs improved.  WBC normalized.  Pain resolved.  Diet tolerated.  Underwent ERCP.  Discharged next day

## 2017-12-20 NOTE — CONSULT NOTE ADULT - SUBJECTIVE AND OBJECTIVE BOX
CARDIOLOGY CONSULT NOTE    Patient is a 57y Female with a known history of :  Morbid obesity (E66.01)  Abdominal pain (R10.9)  Atelectasis (J98.11)  Rheumatoid arthritis (M06.9)  LFT elevation (R79.89)  Kidney stones (N20.0)  Elevated transaminase level (R74.0)        HPI:  57 y.o. F with Morbid obesity and chronic arthritis on high dose narcotics, who I did a lap alize on 6 months ago, presented to the ER today c/o RUQ/R flank pain which began overnight with N/V.  She had eaten cauliflower the night before.  She denies change in bowel habits.  She denies fever/chills.  She does admit to sore throat/nasal congestion for the past few days.  Now she also admits to some L flank pain. (19 Dec 2017 14:46)      Elevated LFTs.  For MRCP and possible ERCP depending on result.    RLQ pain better.    Cardiac hx is negative.  No h/o documented CAD.  Back in Oct 2017, she had about 2 weeks worth of recurrent CP each episode for an hour or so and recurred daily.  Has not recurred in Nov or Dec.  C/o chronic shortness of breath with any activity.  SOB feels worse in past few months.  She attribute it to worsening back and hip and legs pains.    She has been minimally active in past year.  No palpitation or lightheadedness.  Has intermittent edema for long time.          REVIEW OF SYSTEMS:  General:  No wt loss, fevers, chills, night sweats, fatigue,   Eyes:  Good vision, no reported pain  ENT:  No sore throat, pain, runny nose, dysphagia  CV:  no palpitations, hypo/hypertension  Resp:  No dyspnea, cough, tachypnea, wheezing  GI:  + RLQ pain which radiates to flank, + nausea, + vomiting.   :  No pain, bleeding, incontinence, nocturia  Muscle:  No pain, weakness  Neuro:  No weakness, tingling, memory problems  Psych:  No fatigue, insomnia, mood problems, depression  Endocrine:  No polyuria, polydipsia, cold/heat intolerance  Heme:  No petechiae, ecchymosis, easy bruisability  Skin:  No rash, tattoos, scars, edema        MEDICATIONS  (STANDING):  ciprofloxacin   IVPB      ciprofloxacin   IVPB 400 milliGRAM(s) IV Intermittent every 12 hours  enoxaparin Injectable 40 milliGRAM(s) SubCutaneous daily  influenza   Vaccine 0.5 milliLiter(s) IntraMuscular once  lactated ringers. 1000 milliLiter(s) (125 mL/Hr) IV Continuous <Continuous>  levothyroxine 200 MICROGram(s) Oral daily  metroNIDAZOLE  IVPB 500 milliGRAM(s) IV Intermittent every 8 hours  predniSONE   Tablet 5 milliGRAM(s) Oral daily  tamsulosin 0.4 milliGRAM(s) Oral at bedtime    MEDICATIONS  (PRN):  HYDROmorphone  Injectable 1 milliGRAM(s) IV Push every 3 hours PRN Severe Pain (7 - 10)  ketorolac   Injectable 30 milliGRAM(s) IV Push every 8 hours PRN Moderate Pain (4 - 6)  ondansetron Injectable 4 milliGRAM(s) IV Push every 6 hours PRN Nausea and/or Vomiting  oxyCODONE    IR 30 milliGRAM(s) Oral every 4 hours PRN Moderate Pain (4 - 6)      ALLERGIES: penicillin (Unknown)      FAMILY HISTORY:  No pertinent family history in first degree relatives      PHYSICAL EXAMINATION:  -----------------------------  T(C): 36.6 (12-20-17 @ 04:24), Max: 37.1 (12-19-17 @ 10:28)  HR: 56 (12-20-17 @ 04:24) (56 - 91)  BP: 122/78 (12-19-17 @ 23:35) (119/77 - 142/75)  RR: 16 (12-20-17 @ 04:24) (15 - 18)  SpO2: 93% (12-20-17 @ 04:24) (92% - 98%)  Wt(kg): --    12-19 @ 07:01  -  12-20 @ 07:00  --------------------------------------------------------  IN:    lactated ringers.: 1250 mL    Oral Fluid: 240 mL    Solution: 200 mL    Solution: 200 mL  Total IN: 1890 mL    OUT:    Voided: 1250 mL  Total OUT: 1250 mL    Total NET: 640 mL        Height (cm): 157.48 (12-19 @ 10:28)  Weight (kg): 117.9 (12-19 @ 10:28)  BMI (kg/m2): 47.5 (12-19 @ 10:28)  BSA (m2): 2.14 (12-19 @ 10:28)      GENERAL:  Appears stated age, well-groomed, well-nourished, no distress  HEENT:  NC/AT,  conjunctivae clear and pink, no thyromegaly, nodules, adenopathy, no JVD, sclera -anicteric  CHEST:  Full & symmetric excursion, no increased effort, breath sounds clear  HEART:  Regular rhythm, S1, S2, no murmur/rub/S3/S4, no abdominal bruit, no edema  ABDOMEN:  Soft, RLQ tenderness, non-distended, normoactive bowel sounds,  no masses ,no hepato-splenomegaly, no signs of chronic liver disease  EXTREMITIES  no cyanosis,clubbing or edema  SKIN:  No rash/erythema/ecchymoses/petechiae/wounds/abscess/warm/dry  NEURO:  Alert, oriented, no asterixis, no tremor, no encephalopathy        LABS:   --------  12-20    137  |  103  |  20  ----------------------------<  130<H>  3.7   |  29  |  1.30    Ca    8.9      20 Dec 2017 07:32  Mg     2.1     12-20    TPro  8.0  /  Alb  2.7<L>  /  TBili  4.2<H>  /  DBili  3.40<H>  /  AST  657<H>  /  ALT  476<H>  /  AlkPhos  487<H>  12-20                         13.2   8.3   )-----------( 262      ( 20 Dec 2017 07:32 )             42.0     PT/INR - ( 20 Dec 2017 07:32 )   PT: 11.8 sec;   INR: 1.08 ratio         PTT - ( 20 Dec 2017 07:32 )  PTT:32.2 sec            RADIOLOGY:  -----------------        ECG: CARDIOLOGY CONSULT NOTE    Patient is a 57y Female with a known history of :  Morbid obesity (E66.01)  Abdominal pain (R10.9)  Atelectasis (J98.11)  Rheumatoid arthritis (M06.9)  LFT elevation (R79.89)  Kidney stones (N20.0)  Elevated transaminase level (R74.0)        HPI:  57 y.o. F with Morbid obesity and chronic arthritis on high dose narcotics, who I did a lap alize on 6 months ago, presented to the ER today c/o RUQ/R flank pain which began overnight with N/V.  She had eaten cauliflower the night before.  She denies change in bowel habits.  She denies fever/chills.  She does admit to sore throat/nasal congestion for the past few days.  Now she also admits to some L flank pain. (19 Dec 2017 14:46)      Elevated LFTs.  For MRCP and possible ERCP depending on result.    RLQ pain better.    Cardiac hx:  Acute IWMI in 2005 with rescue RCA PTCA   Cardiac cath 2010 EF 55%.  LM normal, LAD 30%.  CFX normal.  RCA patent stent.  Neg perfusion stress test 2013.    Back in Oct 2017, she had about 2 weeks worth of recurrent CP each episode for an hour or so and recurred daily.  Has not recurred in Nov or Dec.  C/o chronic shortness of breath with any activity.  SOB feels worse in past few months.  She attribute it to worsening back and hip and legs pains.    She has been minimally active in past year.  No palpitation or lightheadedness.  Has intermittent edema for long time.        REVIEW OF SYSTEMS:  General:  No wt loss, fevers, chills, night sweats, fatigue,   Eyes:  Good vision, no reported pain  ENT:  No sore throat, pain, runny nose, dysphagia  CV:  no palpitations, hypo/hypertension  Resp:  No dyspnea, cough, tachypnea, wheezing  GI:  + RLQ pain which radiates to flank, + nausea, + vomiting.   :  No pain, bleeding, incontinence, nocturia  Muscle:  No pain, weakness  Neuro:  No weakness, tingling, memory problems  Psych:  No fatigue, insomnia, mood problems, depression  Endocrine:  No polyuria, polydipsia, cold/heat intolerance  Heme:  No petechiae, ecchymosis, easy bruisability  Skin:  No rash, tattoos, scars, edema        MEDICATIONS  (STANDING):  ciprofloxacin   IVPB      ciprofloxacin   IVPB 400 milliGRAM(s) IV Intermittent every 12 hours  enoxaparin Injectable 40 milliGRAM(s) SubCutaneous daily  influenza   Vaccine 0.5 milliLiter(s) IntraMuscular once  lactated ringers. 1000 milliLiter(s) (125 mL/Hr) IV Continuous <Continuous>  levothyroxine 200 MICROGram(s) Oral daily  metroNIDAZOLE  IVPB 500 milliGRAM(s) IV Intermittent every 8 hours  predniSONE   Tablet 5 milliGRAM(s) Oral daily  tamsulosin 0.4 milliGRAM(s) Oral at bedtime    MEDICATIONS  (PRN):  HYDROmorphone  Injectable 1 milliGRAM(s) IV Push every 3 hours PRN Severe Pain (7 - 10)  ketorolac   Injectable 30 milliGRAM(s) IV Push every 8 hours PRN Moderate Pain (4 - 6)  ondansetron Injectable 4 milliGRAM(s) IV Push every 6 hours PRN Nausea and/or Vomiting  oxyCODONE    IR 30 milliGRAM(s) Oral every 4 hours PRN Moderate Pain (4 - 6)      ALLERGIES: penicillin (Unknown)      FAMILY HISTORY:  No pertinent family history in first degree relatives      PHYSICAL EXAMINATION:  -----------------------------  T(C): 36.6 (12-20-17 @ 04:24), Max: 37.1 (12-19-17 @ 10:28)  HR: 56 (12-20-17 @ 04:24) (56 - 91)  BP: 122/78 (12-19-17 @ 23:35) (119/77 - 142/75)  RR: 16 (12-20-17 @ 04:24) (15 - 18)  SpO2: 93% (12-20-17 @ 04:24) (92% - 98%)  Wt(kg): --    12-19 @ 07:01  -  12-20 @ 07:00  --------------------------------------------------------  IN:    lactated ringers.: 1250 mL    Oral Fluid: 240 mL    Solution: 200 mL    Solution: 200 mL  Total IN: 1890 mL    OUT:    Voided: 1250 mL  Total OUT: 1250 mL    Total NET: 640 mL        Height (cm): 157.48 (12-19 @ 10:28)  Weight (kg): 117.9 (12-19 @ 10:28)  BMI (kg/m2): 47.5 (12-19 @ 10:28)  BSA (m2): 2.14 (12-19 @ 10:28)      GENERAL:  Appears stated age, well-groomed, well-nourished, no distress  HEENT:  NC/AT,  conjunctivae clear and pink, no thyromegaly, nodules, adenopathy, no JVD, sclera -anicteric  CHEST:  Full & symmetric excursion, no increased effort, breath sounds clear  HEART:  Regular rhythm, S1, S2, no murmur/rub/S3/S4, no abdominal bruit, no edema  ABDOMEN:  Soft, RLQ tenderness, non-distended, normoactive bowel sounds,  no masses ,no hepato-splenomegaly, no signs of chronic liver disease  EXTREMITIES  no cyanosis,clubbing or edema  SKIN:  No rash/erythema/ecchymoses/petechiae/wounds/abscess/warm/dry  NEURO:  Alert, oriented, no asterixis, no tremor, no encephalopathy        LABS:   --------  12-20    137  |  103  |  20  ----------------------------<  130<H>  3.7   |  29  |  1.30    Ca    8.9      20 Dec 2017 07:32  Mg     2.1     12-20    TPro  8.0  /  Alb  2.7<L>  /  TBili  4.2<H>  /  DBili  3.40<H>  /  AST  657<H>  /  ALT  476<H>  /  AlkPhos  487<H>  12-20                         13.2   8.3   )-----------( 262      ( 20 Dec 2017 07:32 )             42.0     PT/INR - ( 20 Dec 2017 07:32 )   PT: 11.8 sec;   INR: 1.08 ratio         PTT - ( 20 Dec 2017 07:32 )  PTT:32.2 sec            RADIOLOGY:  -----------------        ECG:

## 2017-12-20 NOTE — DISCHARGE NOTE ADULT - PLAN OF CARE
no return of pain low fat, low cholesterol, low salt diet.  Activity as tolerated.  f/u with her cardiologist, Dr. Gonzales, and bariatric surgery no return of pain; normalization of LFTs low fat, low cholesterol, low salt diet.  Activity as tolerated. Cont flomax; f/u with Dr. Wright Weight loss Referred to Dr. Perez Wean off narcotics f/u with PCP

## 2017-12-20 NOTE — DISCHARGE NOTE ADULT - PROVIDER TOKENS
TOKEN:'[id number]:'[id number]' TOKEN:'[id number]:'[id number]:'[id number]'

## 2017-12-20 NOTE — CONSULT NOTE ADULT - ASSESSMENT
· Assessment		  57 year old female who presents with RLQ abdominal pain radiating to the flank, nausea and vomiting.       Elevated transaminase level.   - MRCP ordered to r/o obstruction, ? possible passed stone   - may require ERCP pending above results    Chronic progressive SOB with activity.  - Will repeat echo.  Last done 4/2016 and unremarkable at that time.      Kidney stones.   - Asymptomatic 5 mm stone the lower left ureter causing hydronephrosis and right renal stones.  - Had RLQ pain, might have passed a right ureteral stone. · Assessment		  57 year old female who presents with RLQ abdominal pain radiating to the flank, nausea and vomiting.       Elevated transaminase level.   - MRCP ordered to r/o obstruction, ? possible passed stone   - may require ERCP pending above results    H/o remote IWMI and RCA stent 2005.  - Chronic progressive SOB with activity.  - No EKG in chart.  Will order.  - Will repeat echo.  Last done 4/2016 and unremarkable at that time.  - pt should be on low dose aspirin.    Kidney stones.   - Asymptomatic 5 mm stone the lower left ureter causing hydronephrosis and right renal stones.  - Had RLQ pain, might have passed a right ureteral stone.

## 2017-12-20 NOTE — DISCHARGE NOTE ADULT - PATIENT PORTAL LINK FT
“You can access the [employer] Patient Portal, offered by [facility], by registering with the following website: [email or web address]”

## 2017-12-20 NOTE — DISCHARGE NOTE ADULT - CARE PLAN
Principal Discharge DX:	Right flank pain  Goal:	no return of pain  Instructions for follow-up, activity and diet:	low fat, low cholesterol, low salt diet.  Activity as tolerated.  f/u with her cardiologist, Dr. Gonzales, and bariatric surgery  Secondary Diagnosis:	Ureterolithiasis  Secondary Diagnosis:	LFT elevation  Secondary Diagnosis:	Morbid obesity  Secondary Diagnosis:	Other osteoarthritis involving multiple joints  Secondary Diagnosis:	Rheumatoid arthritis involving knee, unspecified laterality, unspecified rheumatoid factor presence  Secondary Diagnosis:	Coronary artery disease involving native heart without angina pectoris, unspecified vessel or lesion type Principal Discharge DX:	Choledocholithiasis  Goal:	no return of pain; normalization of LFTs  Instructions for follow-up, activity and diet:	low fat, low cholesterol, low salt diet.  Activity as tolerated.  Secondary Diagnosis:	Ureterolithiasis  Instructions for follow-up, activity and diet:	Cont flomax; f/u with Dr. Wright  Secondary Diagnosis:	Morbid obesity  Goal:	Weight loss  Instructions for follow-up, activity and diet:	Referred to Dr. Perez  Secondary Diagnosis:	Other osteoarthritis involving multiple joints  Goal:	Wean off narcotics  Secondary Diagnosis:	Rheumatoid arthritis involving knee, unspecified laterality, unspecified rheumatoid factor presence  Secondary Diagnosis:	Acquired hypothyroidism  Instructions for follow-up, activity and diet:	f/u with PCP

## 2017-12-20 NOTE — DISCHARGE NOTE ADULT - CARE PROVIDER_API CALL
Horacio Lanza), Surgery  [address] [place] [address]  Phone: [phone]  Fax: [phone]    Pasquale Gonzales), Urology  [address] [place] [address]  Phone: [phone]  Fax: [phone] Horacio Lanza), Surgery  [address] [place] [address]  Phone: [phone]  Fax: [phone]    Luis Angel Maxwell (DO), Gastroenterology; Internal Medicine  [address] [place] [address]  Phone: [phone]  Fax: [phone]    Jack Wright), Urology  [address] [place] [address]  Phone: [phone]  Fax: [phone]

## 2017-12-20 NOTE — DISCHARGE NOTE ADULT - SECONDARY DIAGNOSIS.
Ureterolithiasis LFT elevation Morbid obesity Other osteoarthritis involving multiple joints Rheumatoid arthritis involving knee, unspecified laterality, unspecified rheumatoid factor presence Coronary artery disease involving native heart without angina pectoris, unspecified vessel or lesion type Acquired hypothyroidism

## 2017-12-20 NOTE — DISCHARGE NOTE ADULT - MEDICATION SUMMARY - MEDICATIONS TO TAKE
I will START or STAY ON the medications listed below when I get home from the hospital:    prednisoLONE  -- Indication: For Arthritis    oxyCODONE  -- Indication: For Arthritis    tamsulosin 0.4 mg oral capsule  -- 1 cap(s) by mouth once a day (at bedtime)  -- Indication: For ureteral stone    Lyrica  -- Indication: For Arthritis    methotrexate  -- Indication: For Arthritis    levothyroxine  -- Indication: For Hypothyroid    folic acid  -- Indication: For Anemia I will START or STAY ON the medications listed below when I get home from the hospital:    prednisoLONE  -- Indication: For Arthritis    oxyCODONE  -- Indication: For Arthritis    tamsulosin 0.4 mg oral capsule  -- 1 cap(s) by mouth once a day (at bedtime)  -- Indication: For Ureteral stone    Lyrica  -- Indication: For Arthritis    methotrexate  -- Indication: For Arthritis    levothyroxine  -- Indication: For Hypothyroid    folic acid  -- Indication: For Anemia

## 2017-12-20 NOTE — DISCHARGE NOTE ADULT - CARE PROVIDERS DIRECT ADDRESSES
,DirectAddress_Unknown,DirectAddress_Unknown ,DirectAddress_Unknown,DirectAddress_Unknown,[email or web address]

## 2017-12-20 NOTE — CONSULT NOTE ADULT - CONSULT REASON
abd pain  poss OR  kevin operative eval  morbid obesity  atelectasis / KAYLIN
Elevated LFTs, abdominal pain
Preprocedure eval
kidney stones

## 2017-12-21 DIAGNOSIS — N20.1 CALCULUS OF URETER: ICD-10-CM

## 2017-12-21 LAB
CULTURE RESULTS: NO GROWTH — SIGNIFICANT CHANGE UP
HCV RNA FLD QL NAA+PROBE: SIGNIFICANT CHANGE UP
HCV RNA SPEC QL PROBE+SIG AMP: SIGNIFICANT CHANGE UP
SPECIMEN SOURCE: SIGNIFICANT CHANGE UP

## 2017-12-21 RX ORDER — HYDROMORPHONE HYDROCHLORIDE 2 MG/ML
0.5 INJECTION INTRAMUSCULAR; INTRAVENOUS; SUBCUTANEOUS
Qty: 0 | Refills: 0 | Status: DISCONTINUED | OUTPATIENT
Start: 2017-12-21 | End: 2017-12-21

## 2017-12-21 RX ORDER — SODIUM CHLORIDE 9 MG/ML
1000 INJECTION, SOLUTION INTRAVENOUS
Qty: 0 | Refills: 0 | Status: DISCONTINUED | OUTPATIENT
Start: 2017-12-21 | End: 2017-12-21

## 2017-12-21 RX ORDER — OXYCODONE HYDROCHLORIDE 5 MG/1
5 TABLET ORAL EVERY 4 HOURS
Qty: 0 | Refills: 0 | Status: DISCONTINUED | OUTPATIENT
Start: 2017-12-21 | End: 2017-12-21

## 2017-12-21 RX ORDER — OXYCODONE HYDROCHLORIDE 5 MG/1
10 TABLET ORAL EVERY 6 HOURS
Qty: 0 | Refills: 0 | Status: DISCONTINUED | OUTPATIENT
Start: 2017-12-21 | End: 2017-12-21

## 2017-12-21 RX ADMIN — HYDROMORPHONE HYDROCHLORIDE 1 MILLIGRAM(S): 2 INJECTION INTRAMUSCULAR; INTRAVENOUS; SUBCUTANEOUS at 10:16

## 2017-12-21 RX ADMIN — Medication 100 MILLIGRAM(S): at 21:14

## 2017-12-21 RX ADMIN — Medication 100 MILLIGRAM(S): at 05:32

## 2017-12-21 RX ADMIN — SODIUM CHLORIDE 90 MILLILITER(S): 9 INJECTION, SOLUTION INTRAVENOUS at 12:02

## 2017-12-21 RX ADMIN — Medication 30 MILLIGRAM(S): at 07:54

## 2017-12-21 RX ADMIN — HYDROMORPHONE HYDROCHLORIDE 1 MILLIGRAM(S): 2 INJECTION INTRAMUSCULAR; INTRAVENOUS; SUBCUTANEOUS at 21:09

## 2017-12-21 RX ADMIN — HYDROMORPHONE HYDROCHLORIDE 1 MILLIGRAM(S): 2 INJECTION INTRAMUSCULAR; INTRAVENOUS; SUBCUTANEOUS at 04:40

## 2017-12-21 RX ADMIN — ENOXAPARIN SODIUM 40 MILLIGRAM(S): 100 INJECTION SUBCUTANEOUS at 21:14

## 2017-12-21 RX ADMIN — HYDROMORPHONE HYDROCHLORIDE 1 MILLIGRAM(S): 2 INJECTION INTRAMUSCULAR; INTRAVENOUS; SUBCUTANEOUS at 05:32

## 2017-12-21 RX ADMIN — TAMSULOSIN HYDROCHLORIDE 0.4 MILLIGRAM(S): 0.4 CAPSULE ORAL at 21:14

## 2017-12-21 RX ADMIN — Medication 30 MILLIGRAM(S): at 05:57

## 2017-12-21 RX ADMIN — HYDROMORPHONE HYDROCHLORIDE 1 MILLIGRAM(S): 2 INJECTION INTRAMUSCULAR; INTRAVENOUS; SUBCUTANEOUS at 09:44

## 2017-12-21 RX ADMIN — HYDROMORPHONE HYDROCHLORIDE 1 MILLIGRAM(S): 2 INJECTION INTRAMUSCULAR; INTRAVENOUS; SUBCUTANEOUS at 19:58

## 2017-12-21 RX ADMIN — Medication 200 MILLIGRAM(S): at 05:32

## 2017-12-21 RX ADMIN — SODIUM CHLORIDE 100 MILLILITER(S): 9 INJECTION, SOLUTION INTRAVENOUS at 16:06

## 2017-12-21 RX ADMIN — Medication 200 MILLIGRAM(S): at 19:09

## 2017-12-21 NOTE — CHART NOTE - NSCHARTNOTEFT_GEN_A_CORE
Called by RN for Pt c/o pain in R hand. Patient seen and examined at bedside. Patient states she does get occasional numbness tingling in B/L hands. States about 10 minutes ago she had an episode of severe pain in her R hand, states she was unable to move it for about 60 secs, but returned to normal after. Patient denies any complaints at this time. States she has had problems with carpal tunnel in the past. Patient denies recent trauma to hand.        T(C): 36.4 (17 @ 19:56), Max: 37.1 (17 @ 07:30)  HR: 71 (17 @ 19:56) (62 - 77)  BP: 168/90 (17 @ 19:56) (115/62 - 168/90)  RR: 18 (17 @ 19:56) (15 - 18)  SpO2: 97% (17 @ 19:56) (92% - 99%)  Wt(kg): --    Physical :  MSK: normal sensation B/L hands, good strength B/L hands    LABS:                        13.2   8.3   )-----------( 262      ( 20 Dec 2017 07:32 )             42.0     12-    137  |  103  |  20  ----------------------------<  130<H>  3.7   |  29  |  1.30    Ca    8.9      20 Dec 2017 07:32  Mg     2.1     -    TPro  8.0  /  Alb  2.7<L>  /  TBili  4.2<H>  /  DBili  3.40<H>  /  AST  657<H>  /  ALT  476<H>  /  AlkPhos  487<H>  12    PT/INR - ( 20 Dec 2017 07:32 )   PT: 11.8 sec;   INR: 1.08 ratio         PTT - ( 20 Dec 2017 07:32 )  PTT:32.2 sec  Urinalysis Basic - ( 20 Dec 2017 20:44 )    Color: Yellow / Appearance: Clear / S.005 / pH: x  Gluc: x / Ketone: Negative  / Bili: Small / Urobili: 8   Blood: x / Protein: 25 mg/dL / Nitrite: Negative   Leuk Esterase: Moderate / RBC: 11-25 /HPF / WBC >50   Sq Epi: x / Non Sq Epi: Occasional / Bacteria: Few              Assessment/Plan  57yFemale admitted for abdominal pain, s/p ERCP today, complaining of pain in R hand- now resolved  1. Pain likely 2/2 RA vs nerve irritation- patient asymptomatic currently, denies any complaints- continue to monitor, RN to call with any changes Called by RN for Pt c/o pain in R hand. Patient seen and examined at bedside. Patient states she does get occasional numbness tingling in B/L hands. States about 10 minutes ago she had an episode of severe pain in her R hand, states she was unable to move it for about 60 secs, but returned to normal after. Patient denies any complaints at this time. States she has had problems with carpal tunnel in the past. Patient denies recent trauma to hand.        T(C): 36.4 (17 @ 19:56), Max: 37.1 (17 @ 07:30)  HR: 71 (17 @ 19:56) (62 - 77)  BP: 168/90 (17 @ 19:56) (115/62 - 168/90)  RR: 18 (17 @ 19:56) (15 - 18)  SpO2: 97% (17 @ 19:56) (92% - 99%)  Wt(kg): --    Physical :  MSK: normal sensation B/L hands, good strength B/L hands  EXT: 2+ radial pulses B/L UE    LABS:                        13.2   8.3   )-----------( 262      ( 20 Dec 2017 07:32 )             42.0     12    137  |  103  |  20  ----------------------------<  130<H>  3.7   |  29  |  1.30    Ca    8.9      20 Dec 2017 07:32  Mg     2.1         TPro  8.0  /  Alb  2.7<L>  /  TBili  4.2<H>  /  DBili  3.40<H>  /  AST  657<H>  /  ALT  476<H>  /  AlkPhos  487<H>  12    PT/INR - ( 20 Dec 2017 07:32 )   PT: 11.8 sec;   INR: 1.08 ratio         PTT - ( 20 Dec 2017 07:32 )  PTT:32.2 sec  Urinalysis Basic - ( 20 Dec 2017 20:44 )    Color: Yellow / Appearance: Clear / S.005 / pH: x  Gluc: x / Ketone: Negative  / Bili: Small / Urobili: 8   Blood: x / Protein: 25 mg/dL / Nitrite: Negative   Leuk Esterase: Moderate / RBC: 11-25 /HPF / WBC >50   Sq Epi: x / Non Sq Epi: Occasional / Bacteria: Few              Assessment/Plan  57yFemale admitted for abdominal pain, s/p ERCP today, complaining of pain in R hand- now resolved  1. Pain likely 2/2 RA vs nerve irritation- patient asymptomatic currently, denies any complaints- continue to monitor, RN to call with any changes

## 2017-12-21 NOTE — PROVIDER CONTACT NOTE (OTHER) - ASSESSMENT
c/o right arm sharp pain followed by inability to move hand, lasted less than 1 minute, has resolved

## 2017-12-21 NOTE — PROGRESS NOTE ADULT - PROBLEM SELECTOR PLAN 1
for ERCP this am  pain relief  NPO  IVF  monitor LABS  replete lytes  GI following  Surgery following for ERCP this am  pain relief  NPO  IVF  monitor LABS  replete lytes  GI following  Surgery following  patient for OR / procedure this am, no objections

## 2017-12-22 VITALS
TEMPERATURE: 98 F | SYSTOLIC BLOOD PRESSURE: 160 MMHG | OXYGEN SATURATION: 95 % | HEART RATE: 67 BPM | DIASTOLIC BLOOD PRESSURE: 95 MMHG | RESPIRATION RATE: 18 BRPM

## 2017-12-22 DIAGNOSIS — M06.9 RHEUMATOID ARTHRITIS, UNSPECIFIED: ICD-10-CM

## 2017-12-22 DIAGNOSIS — B19.20 UNSPECIFIED VIRAL HEPATITIS C WITHOUT HEPATIC COMA: ICD-10-CM

## 2017-12-22 PROCEDURE — 80074 ACUTE HEPATITIS PANEL: CPT

## 2017-12-22 PROCEDURE — 93306 TTE W/DOPPLER COMPLETE: CPT

## 2017-12-22 PROCEDURE — 80048 BASIC METABOLIC PNL TOTAL CA: CPT

## 2017-12-22 PROCEDURE — 87521 HEPATITIS C PROBE&RVRS TRNSC: CPT

## 2017-12-22 PROCEDURE — 96375 TX/PRO/DX INJ NEW DRUG ADDON: CPT

## 2017-12-22 PROCEDURE — 74181 MRI ABDOMEN W/O CONTRAST: CPT

## 2017-12-22 PROCEDURE — 93005 ELECTROCARDIOGRAM TRACING: CPT

## 2017-12-22 PROCEDURE — 80307 DRUG TEST PRSMV CHEM ANLYZR: CPT

## 2017-12-22 PROCEDURE — 87486 CHLMYD PNEUM DNA AMP PROBE: CPT

## 2017-12-22 PROCEDURE — 85610 PROTHROMBIN TIME: CPT

## 2017-12-22 PROCEDURE — 84480 ASSAY TRIIODOTHYRONINE (T3): CPT

## 2017-12-22 PROCEDURE — 84702 CHORIONIC GONADOTROPIN TEST: CPT

## 2017-12-22 PROCEDURE — 80076 HEPATIC FUNCTION PANEL: CPT

## 2017-12-22 PROCEDURE — 83690 ASSAY OF LIPASE: CPT

## 2017-12-22 PROCEDURE — 85730 THROMBOPLASTIN TIME PARTIAL: CPT

## 2017-12-22 PROCEDURE — 85027 COMPLETE CBC AUTOMATED: CPT

## 2017-12-22 PROCEDURE — 74176 CT ABD & PELVIS W/O CONTRAST: CPT

## 2017-12-22 PROCEDURE — 87633 RESP VIRUS 12-25 TARGETS: CPT

## 2017-12-22 PROCEDURE — 96374 THER/PROPH/DIAG INJ IV PUSH: CPT

## 2017-12-22 PROCEDURE — 99285 EMERGENCY DEPT VISIT HI MDM: CPT | Mod: 25

## 2017-12-22 PROCEDURE — 87798 DETECT AGENT NOS DNA AMP: CPT

## 2017-12-22 PROCEDURE — 87086 URINE CULTURE/COLONY COUNT: CPT

## 2017-12-22 PROCEDURE — 80053 COMPREHEN METABOLIC PANEL: CPT

## 2017-12-22 PROCEDURE — 83735 ASSAY OF MAGNESIUM: CPT

## 2017-12-22 PROCEDURE — 84443 ASSAY THYROID STIM HORMONE: CPT

## 2017-12-22 PROCEDURE — 84436 ASSAY OF TOTAL THYROXINE: CPT

## 2017-12-22 PROCEDURE — C1769: CPT

## 2017-12-22 PROCEDURE — 81001 URINALYSIS AUTO W/SCOPE: CPT

## 2017-12-22 PROCEDURE — 87581 M.PNEUMON DNA AMP PROBE: CPT

## 2017-12-22 PROCEDURE — 76000 FLUOROSCOPY <1 HR PHYS/QHP: CPT

## 2017-12-22 RX ORDER — TAMSULOSIN HYDROCHLORIDE 0.4 MG/1
1 CAPSULE ORAL
Qty: 30 | Refills: 0 | OUTPATIENT
Start: 2017-12-22

## 2017-12-22 RX ADMIN — HYDROMORPHONE HYDROCHLORIDE 1 MILLIGRAM(S): 2 INJECTION INTRAMUSCULAR; INTRAVENOUS; SUBCUTANEOUS at 05:29

## 2017-12-22 RX ADMIN — Medication 30 MILLIGRAM(S): at 02:22

## 2017-12-22 RX ADMIN — HYDROMORPHONE HYDROCHLORIDE 1 MILLIGRAM(S): 2 INJECTION INTRAMUSCULAR; INTRAVENOUS; SUBCUTANEOUS at 10:23

## 2017-12-22 RX ADMIN — Medication 30 MILLIGRAM(S): at 00:13

## 2017-12-22 RX ADMIN — Medication 5 MILLIGRAM(S): at 05:27

## 2017-12-22 RX ADMIN — Medication 30 MILLIGRAM(S): at 12:01

## 2017-12-22 RX ADMIN — HYDROMORPHONE HYDROCHLORIDE 1 MILLIGRAM(S): 2 INJECTION INTRAMUSCULAR; INTRAVENOUS; SUBCUTANEOUS at 06:27

## 2017-12-22 RX ADMIN — Medication 200 MICROGRAM(S): at 05:27

## 2017-12-22 RX ADMIN — ENOXAPARIN SODIUM 40 MILLIGRAM(S): 100 INJECTION SUBCUTANEOUS at 11:55

## 2017-12-22 RX ADMIN — HYDROMORPHONE HYDROCHLORIDE 1 MILLIGRAM(S): 2 INJECTION INTRAMUSCULAR; INTRAVENOUS; SUBCUTANEOUS at 05:57

## 2017-12-22 RX ADMIN — HYDROMORPHONE HYDROCHLORIDE 1 MILLIGRAM(S): 2 INJECTION INTRAMUSCULAR; INTRAVENOUS; SUBCUTANEOUS at 02:27

## 2017-12-22 RX ADMIN — HYDROMORPHONE HYDROCHLORIDE 1 MILLIGRAM(S): 2 INJECTION INTRAMUSCULAR; INTRAVENOUS; SUBCUTANEOUS at 11:51

## 2017-12-22 RX ADMIN — Medication 200 MILLIGRAM(S): at 05:27

## 2017-12-22 RX ADMIN — Medication 100 MILLIGRAM(S): at 05:27

## 2017-12-22 RX ADMIN — Medication 30 MILLIGRAM(S): at 12:16

## 2017-12-22 NOTE — PROGRESS NOTE ADULT - PROBLEM SELECTOR PROBLEM 3
Osteoarthritis of both knees, unspecified osteoarthritis type
Acquired hypothyroidism
LFT elevation
Morbid obesity
Kidney stones
Kidney stones

## 2017-12-22 NOTE — PROGRESS NOTE ADULT - PROBLEM SELECTOR PLAN 5
denise and ohs risk   will need outpatient work up and DENISE eval, PSG testing  sleep hygiene discussed  weight loss rec.

## 2017-12-22 NOTE — PROGRESS NOTE ADULT - SUBJECTIVE AND OBJECTIVE BOX
GENERAL SURGERY Progress Note    HPI: The patient denies abd pain.  Tolerating clears.  Hungry.  Ambulating.  Med/Card/Urology consults appreciated    MEDICATIONS  (STANDING):  ciprofloxacin   IVPB      ciprofloxacin   IVPB 400 milliGRAM(s) IV Intermittent every 12 hours  enoxaparin Injectable 40 milliGRAM(s) SubCutaneous daily  influenza   Vaccine 0.5 milliLiter(s) IntraMuscular once  lactated ringers. 1000 milliLiter(s) (125 mL/Hr) IV Continuous <Continuous>  levothyroxine 200 MICROGram(s) Oral daily  metroNIDAZOLE  IVPB 500 milliGRAM(s) IV Intermittent every 8 hours  predniSONE   Tablet 5 milliGRAM(s) Oral daily  tamsulosin 0.4 milliGRAM(s) Oral at bedtime    MEDICATIONS  (PRN):  HYDROmorphone  Injectable 1 milliGRAM(s) IV Push every 3 hours PRN Severe Pain (7 - 10)  ketorolac   Injectable 30 milliGRAM(s) IV Push every 8 hours PRN Moderate Pain (4 - 6)  ondansetron Injectable 4 milliGRAM(s) IV Push every 6 hours PRN Nausea and/or Vomiting  oxyCODONE    IR 30 milliGRAM(s) Oral every 4 hours PRN Moderate Pain (4 - 6)      Vital Signs Last 24 Hrs  T(C): 36.5 (20 Dec 2017 09:53), Max: 37.1 (19 Dec 2017 17:53)  T(F): 97.7 (20 Dec 2017 09:53), Max: 98.8 (19 Dec 2017 17:53)  HR: 60 (20 Dec 2017 09:53) (56 - 91)  BP: 116/75 (20 Dec 2017 09:53) (114/70 - 139/75)  BP(mean): --  RR: 16 (20 Dec 2017 09:53) (15 - 16)  SpO2: 92% (20 Dec 2017 09:53) (92% - 100%)    I&O's Detail    19 Dec 2017 07:01  -  20 Dec 2017 07:00  --------------------------------------------------------  IN:    lactated ringers.: 1250 mL    Oral Fluid: 240 mL    Solution: 200 mL    Solution: 200 mL  Total IN: 1890 mL    OUT:    Voided: 1250 mL  Total OUT: 1250 mL    Total NET: 640 mL        Physical Exam:  Abd: Soft, NT, ND      LABS:  CBC Full  -  ( 20 Dec 2017 07:32 )  WBC Count : 8.3 K/uL  Hemoglobin : 13.2 g/dL  Hematocrit : 42.0 %  Platelet Count - Automated : 262 K/uL  Mean Cell Volume : 97.9 fl  Mean Cell Hemoglobin : 30.7 pg  Mean Cell Hemoglobin Concentration : 31.4 gm/dL  Auto Neutrophil # : 5.9 K/uL  Auto Lymphocyte # : 1.9 K/uL  Auto Monocyte # : 0.3 K/uL  Auto Eosinophil # : 0.1 K/uL  Auto Basophil # : 0.0 K/uL  Auto Neutrophil % : 71.8 %  Auto Lymphocyte % : 23.2 %  Auto Monocyte % : 3.1 %  Auto Eosinophil % : 1.4 %  Auto Basophil % : 0.5 %    12-20    137  |  103  |  20  ----------------------------<  130<H>  3.7   |  29  |  1.30    Ca    8.9      20 Dec 2017 07:32  Mg     2.1     12-20    TPro  8.0  /  Alb  2.7<L>  /  TBili  4.2<H>  /  DBili  3.40<H>  /  AST  657<H>  /  ALT  476<H>  /  AlkPhos  487<H>  12-20    PT/INR - ( 20 Dec 2017 07:32 )   PT: 11.8 sec;   INR: 1.08 ratio         PTT - ( 20 Dec 2017 07:32 )  PTT:32.2 sec        LIVER FUNCTIONS - ( 20 Dec 2017 07:32 )  Alb: 2.7 g/dL / Pro: 8.0 g/dL / ALK PHOS: 487 U/L / ALT: 476 U/L / AST: 657 U/L / GGT: x               RADIOLOGY & ADDITIONAL STUDIES:    MRCP report pending
Date/Time Patient Seen:  		  Referring MD:   Data Reviewed	       Patient is a 57y old  Female who presents with a chief complaint of R flank pain (19 Dec 2017 14:46)  in bed  seen and examined  vs and meds reviewed      Subjective/HPI     PAST MEDICAL & SURGICAL HISTORY:  Kidney stones  Carpal tunnel syndrome  Neuropathy  Scoliosis  Migraine  Hypothyroid  Fibromyalgia  MI (myocardial infarction)  Osteoarthritis  Rheumatoid arthritis  History of laparoscopic cholecystectomy        Medication list         MEDICATIONS  (STANDING):  ciprofloxacin   IVPB      ciprofloxacin   IVPB 400 milliGRAM(s) IV Intermittent every 12 hours  enoxaparin Injectable 40 milliGRAM(s) SubCutaneous daily  influenza   Vaccine 0.5 milliLiter(s) IntraMuscular once  lactated ringers. 1000 milliLiter(s) (125 mL/Hr) IV Continuous <Continuous>  levothyroxine 200 MICROGram(s) Oral daily  metroNIDAZOLE  IVPB 500 milliGRAM(s) IV Intermittent every 8 hours  predniSONE   Tablet 5 milliGRAM(s) Oral daily  tamsulosin 0.4 milliGRAM(s) Oral at bedtime    MEDICATIONS  (PRN):  HYDROmorphone  Injectable 1 milliGRAM(s) IV Push every 3 hours PRN Severe Pain (7 - 10)  ketorolac   Injectable 30 milliGRAM(s) IV Push every 8 hours PRN Moderate Pain (4 - 6)  ondansetron Injectable 4 milliGRAM(s) IV Push every 6 hours PRN Nausea and/or Vomiting  oxyCODONE    IR 30 milliGRAM(s) Oral every 4 hours PRN Moderate Pain (4 - 6)         Vitals log        ICU Vital Signs Last 24 Hrs  T(C): 36.6 (20 Dec 2017 04:24), Max: 37.1 (19 Dec 2017 10:28)  T(F): 97.9 (20 Dec 2017 04:24), Max: 98.8 (19 Dec 2017 10:28)  HR: 56 (20 Dec 2017 04:24) (56 - 91)  BP: 122/78 (19 Dec 2017 23:35) (119/77 - 142/75)  BP(mean): --  ABP: --  ABP(mean): --  RR: 16 (20 Dec 2017 04:24) (15 - 18)  SpO2: 93% (20 Dec 2017 04:24) (92% - 98%)           Input and Output:  I&O's Detail    19 Dec 2017 07:01  -  20 Dec 2017 06:00  --------------------------------------------------------  IN:    Oral Fluid: 240 mL    Solution: 100 mL  Total IN: 340 mL    OUT:    Voided: 600 mL  Total OUT: 600 mL    Total NET: -260 mL          Lab Data                        13.7   12.3  )-----------( 276      ( 19 Dec 2017 12:04 )             43.4     12-19    134<L>  |  98  |  22  ----------------------------<  209<H>  3.9   |  27  |  1.60<H>    Ca    9.5      19 Dec 2017 12:04    TPro  8.6<H>  /  Alb  3.1<L>  /  TBili  2.9<H>  /  DBili  x   /  AST  758<H>  /  ALT  374<H>  /  AlkPhos  593<H>  12-19            Review of Systems	      Objective     Physical Examination    head at  heart s1s2  lungs dec BS  abd soft      Pertinent Lab findings & Imaging      Hossein:  NO   Adequate UO     I&O's Detail    19 Dec 2017 07:01  -  20 Dec 2017 06:00  --------------------------------------------------------  IN:    Oral Fluid: 240 mL    Solution: 100 mL  Total IN: 340 mL    OUT:    Voided: 600 mL  Total OUT: 600 mL    Total NET: -260 mL               Discussed with:     Cultures:	        Radiology
Date/Time Patient Seen:  		  Referring MD:   Data Reviewed	       Patient is a 57y old  Female who presents with a chief complaint of R flank pain, elevated LFTs (20 Dec 2017 11:31)  in bed  seen and examined  vs and meds reviewed        Subjective/HPI     PAST MEDICAL & SURGICAL HISTORY:  Kidney stones  Carpal tunnel syndrome  Neuropathy  Scoliosis  Migraine  Hypothyroid  Fibromyalgia  MI (myocardial infarction)  Osteoarthritis  Rheumatoid arthritis  History of laparoscopic cholecystectomy        Medication list         MEDICATIONS  (STANDING):  amitriptyline 100 milliGRAM(s) Oral at bedtime  ciprofloxacin   IVPB      ciprofloxacin   IVPB 400 milliGRAM(s) IV Intermittent every 12 hours  enoxaparin Injectable 40 milliGRAM(s) SubCutaneous daily  influenza   Vaccine 0.5 milliLiter(s) IntraMuscular once  lactated ringers. 1000 milliLiter(s) (90 mL/Hr) IV Continuous <Continuous>  levothyroxine 200 MICROGram(s) Oral daily  metroNIDAZOLE  IVPB 500 milliGRAM(s) IV Intermittent every 8 hours  predniSONE   Tablet 5 milliGRAM(s) Oral daily  tamsulosin 0.4 milliGRAM(s) Oral at bedtime    MEDICATIONS  (PRN):  HYDROmorphone  Injectable 1 milliGRAM(s) IV Push every 3 hours PRN Severe Pain (7 - 10)  ketorolac   Injectable 30 milliGRAM(s) IV Push every 8 hours PRN Moderate Pain (4 - 6)  ondansetron Injectable 4 milliGRAM(s) IV Push every 6 hours PRN Nausea and/or Vomiting  oxyCODONE    IR 30 milliGRAM(s) Oral every 4 hours PRN Moderate Pain (4 - 6)         Vitals log        ICU Vital Signs Last 24 Hrs  T(C): 36.9 (21 Dec 2017 23:17), Max: 37.1 (21 Dec 2017 07:30)  T(F): 98.5 (21 Dec 2017 23:17), Max: 98.7 (21 Dec 2017 07:30)  HR: 77 (21 Dec 2017 23:17) (62 - 77)  BP: 160/89 (21 Dec 2017 23:17) (116/75 - 168/90)  BP(mean): --  ABP: --  ABP(mean): --  RR: 17 (21 Dec 2017 23:17) (15 - 18)  SpO2: 97% (21 Dec 2017 23:17) (94% - 99%)           Input and Output:  I&O's Detail    20 Dec 2017 07:01  -  21 Dec 2017 07:00  --------------------------------------------------------  IN:    lactated ringers.: 2965 mL    Oral Fluid: 120 mL    Solution: 300 mL    Solution: 400 mL  Total IN: 3785 mL    OUT:    Voided: 600 mL  Total OUT: 600 mL    Total NET: 3185 mL      21 Dec 2017 07:01  -  22 Dec 2017 06:08  --------------------------------------------------------  IN:    lactated ringers.: 300 mL    lactated ringers.: 990 mL    Oral Fluid: 360 mL    Solution: 100 mL    Solution: 200 mL  Total IN: 1950 mL    OUT:  Total OUT: 0 mL    Total NET: 1950 mL          Lab Data                        13.2   8.3   )-----------( 262      ( 20 Dec 2017 07:32 )             42.0     12-20    137  |  103  |  20  ----------------------------<  130<H>  3.7   |  29  |  1.30    Ca    8.9      20 Dec 2017 07:32  Mg     2.1     12-20    TPro  8.0  /  Alb  2.7<L>  /  TBili  4.2<H>  /  DBili  3.40<H>  /  AST  657<H>  /  ALT  476<H>  /  AlkPhos  487<H>  12-20            Review of Systems	      Objective     Physical Examination    obese  head at  heart s1s2  lungs dec BS  overnight events noted  POD 1     Pertinent Lab findings & Imaging      Hossein:  NO   Adequate UO     I&O's Detail    20 Dec 2017 07:01  -  21 Dec 2017 07:00  --------------------------------------------------------  IN:    lactated ringers.: 2965 mL    Oral Fluid: 120 mL    Solution: 300 mL    Solution: 400 mL  Total IN: 3785 mL    OUT:    Voided: 600 mL  Total OUT: 600 mL    Total NET: 3185 mL      21 Dec 2017 07:01  -  22 Dec 2017 06:08  --------------------------------------------------------  IN:    lactated ringers.: 300 mL    lactated ringers.: 990 mL    Oral Fluid: 360 mL    Solution: 100 mL    Solution: 200 mL  Total IN: 1950 mL    OUT:  Total OUT: 0 mL    Total NET: 1950 mL               Discussed with:     Cultures:	        Radiology
GENERAL SURGERY Progress Note    HPI: The patient denies abd pain.  Tolerating reg diet.     MEDICATIONS  (STANDING):  amitriptyline 100 milliGRAM(s) Oral at bedtime  ciprofloxacin   IVPB      ciprofloxacin   IVPB 400 milliGRAM(s) IV Intermittent every 12 hours  enoxaparin Injectable 40 milliGRAM(s) SubCutaneous daily  influenza   Vaccine 0.5 milliLiter(s) IntraMuscular once  lactated ringers. 1000 milliLiter(s) (90 mL/Hr) IV Continuous <Continuous>  levothyroxine 200 MICROGram(s) Oral daily  metroNIDAZOLE  IVPB 500 milliGRAM(s) IV Intermittent every 8 hours  predniSONE   Tablet 5 milliGRAM(s) Oral daily  tamsulosin 0.4 milliGRAM(s) Oral at bedtime    MEDICATIONS  (PRN):  HYDROmorphone  Injectable 1 milliGRAM(s) IV Push every 3 hours PRN Severe Pain (7 - 10)  ketorolac   Injectable 30 milliGRAM(s) IV Push every 8 hours PRN Moderate Pain (4 - 6)  ondansetron Injectable 4 milliGRAM(s) IV Push every 6 hours PRN Nausea and/or Vomiting  oxyCODONE    IR 30 milliGRAM(s) Oral every 4 hours PRN Moderate Pain (4 - 6)      Vital Signs Last 24 Hrs  T(C): 36.6 (21 Dec 2017 12:54), Max: 37.1 (21 Dec 2017 07:30)  T(F): 97.9 (21 Dec 2017 12:54), Max: 98.7 (21 Dec 2017 07:30)  HR: 62 (21 Dec 2017 12:54) (62 - 70)  BP: 116/75 (21 Dec 2017 12:54) (95/58 - 144/78)  BP(mean): --  RR: 18 (21 Dec 2017 12:54) (17 - 18)  SpO2: 94% (21 Dec 2017 12:54) (92% - 95%)    I&O's Detail    20 Dec 2017 07:01  -  21 Dec 2017 07:00  --------------------------------------------------------  IN:    lactated ringers.: 2965 mL    Oral Fluid: 120 mL    Solution: 300 mL    Solution: 400 mL  Total IN: 3785 mL    OUT:    Voided: 600 mL  Total OUT: 600 mL    Total NET: 3185 mL      21 Dec 2017 07:01  -  21 Dec 2017 13:17  --------------------------------------------------------  IN:    lactated ringers.: 360 mL  Total IN: 360 mL    OUT:  Total OUT: 0 mL    Total NET: 360 mL      Physical Exam:  Abd: Soft, ND, NT      LABS:  CBC Full  -  ( 20 Dec 2017 07:32 )  WBC Count : 8.3 K/uL  Hemoglobin : 13.2 g/dL  Hematocrit : 42.0 %  Platelet Count - Automated : 262 K/uL  Mean Cell Volume : 97.9 fl  Mean Cell Hemoglobin : 30.7 pg  Mean Cell Hemoglobin Concentration : 31.4 gm/dL  Auto Neutrophil # : 5.9 K/uL  Auto Lymphocyte # : 1.9 K/uL  Auto Monocyte # : 0.3 K/uL  Auto Eosinophil # : 0.1 K/uL  Auto Basophil # : 0.0 K/uL  Auto Neutrophil % : 71.8 %  Auto Lymphocyte % : 23.2 %  Auto Monocyte % : 3.1 %  Auto Eosinophil % : 1.4 %  Auto Basophil % : 0.5 %        137  |  103  |  20  ----------------------------<  130<H>  3.7   |  29  |  1.30    Ca    8.9      20 Dec 2017 07:32  Mg     2.1         TPro  8.0  /  Alb  2.7<L>  /  TBili  4.2<H>  /  DBili  3.40<H>  /  AST  657<H>  /  ALT  476<H>  /  AlkPhos  487<H>  12    PT/INR - ( 20 Dec 2017 07:32 )   PT: 11.8 sec;   INR: 1.08 ratio         PTT - ( 20 Dec 2017 07:32 )  PTT:32.2 sec  Urinalysis Basic - ( 20 Dec 2017 20:44 )    Color: Yellow / Appearance: Clear / S.005 / pH: x  Gluc: x / Ketone: Negative  / Bili: Small / Urobili: 8   Blood: x / Protein: 25 mg/dL / Nitrite: Negative   Leuk Esterase: Moderate / RBC: 11-25 /HPF / WBC >50   Sq Epi: x / Non Sq Epi: Occasional / Bacteria: Few      LIVER FUNCTIONS - ( 20 Dec 2017 07:32 )  Alb: 2.7 g/dL / Pro: 8.0 g/dL / ALK PHOS: 487 U/L / ALT: 476 U/L / AST: 657 U/L / GGT: x           RADIOLOGY & ADDITIONAL STUDIES:    < from: MR MRCP No Cont (17 @ 09:43) >    EXAM:  MR MRCP                            PROCEDURE DATE:  2017          INTERPRETATION:  Clinical Information: Elevated LFTs with possible   retained stone.  Right flank pain.    Comparison: CT scan of the abdomen and pelvis from 2017.      Technique: MRI/MRCP of the abdomen was performed utilizing multiplanar   imaging without the administration of intravenous contrast. MRCP and   thick slab images were performed.    Findings:     There is a hypointense T2 filling defect within the distal common bile   duct for example on series 14 image 5 and series 10 image 32 which is   consistent with a stone measuring 2 mm in the transaxial dimension and 4   mm in the craniocaudad dimension. The common duct is not dilated   measuring 4 mmproximally. The pancreatic duct appears unremarkable. The   patient is status post cholecystectomy.    Liver, spleen and pancreas appear unremarkable. There is moderate   left-sided hydronephrosis again noted with an obstructing distal ureteral   stone identified on the recent CT scan.    There is no retroperitoneal adenopathy. No ascites is identified. The   bowel appears unremarkable. There is minimal atelectasis at the right   lung base.    Impression:   Small filling defect within the distal common bile duct consistent with a   stone. No intrahepatic or extra hepatic biliary ductal dilatation.    The above findings were discussed by Dr. Dang with Dr. Montes on   2017 at 11:40 AM with read-back.          YOSELIN DANG M.D., ATTENDING RADIOLOGIST  This document has been electronically signed. Dec 20 2017 11:46AM
GENERAL SURGERY Progress Note    HPI: The patient is tolerating reg diet.  Deneis abd pain.  ERCP uneventful    MEDICATIONS  (STANDING):  amitriptyline 100 milliGRAM(s) Oral at bedtime  ciprofloxacin   IVPB      ciprofloxacin   IVPB 400 milliGRAM(s) IV Intermittent every 12 hours  enoxaparin Injectable 40 milliGRAM(s) SubCutaneous daily  influenza   Vaccine 0.5 milliLiter(s) IntraMuscular once  lactated ringers. 1000 milliLiter(s) (90 mL/Hr) IV Continuous <Continuous>  levothyroxine 200 MICROGram(s) Oral daily  metroNIDAZOLE  IVPB 500 milliGRAM(s) IV Intermittent every 8 hours  predniSONE   Tablet 5 milliGRAM(s) Oral daily  tamsulosin 0.4 milliGRAM(s) Oral at bedtime    MEDICATIONS  (PRN):  HYDROmorphone  Injectable 1 milliGRAM(s) IV Push every 3 hours PRN Severe Pain (7 - 10)  ketorolac   Injectable 30 milliGRAM(s) IV Push every 8 hours PRN Moderate Pain (4 - 6)  ondansetron Injectable 4 milliGRAM(s) IV Push every 6 hours PRN Nausea and/or Vomiting  oxyCODONE    IR 30 milliGRAM(s) Oral every 4 hours PRN Moderate Pain (4 - 6)      Vital Signs Last 24 Hrs  T(C): 36.7 (22 Dec 2017 07:12), Max: 37 (21 Dec 2017 13:52)  T(F): 98.1 (22 Dec 2017 07:12), Max: 98.6 (21 Dec 2017 13:52)  HR: 64 (22 Dec 2017 07:12) (62 - 77)  BP: 135/76 (22 Dec 2017 07:12) (116/75 - 168/90)  BP(mean): --  RR: 16 (22 Dec 2017 07:12) (15 - 18)  SpO2: 97% (22 Dec 2017 07:12) (94% - 99%)    I&O's Detail    21 Dec 2017 07:01  -  22 Dec 2017 07:00  --------------------------------------------------------  IN:    lactated ringers.: 300 mL    lactated ringers.: 990 mL    Oral Fluid: 360 mL    Solution: 400 mL    Solution: 200 mL  Total IN: 2250 mL    OUT:  Total OUT: 0 mL    Total NET: 2250 mL      Physical Exam:  Abd: Soft, NT, ND
Interval Events: MRCP positive for CBD stone. Pt is without pain today.     MEDICATIONS  (STANDING):  ciprofloxacin   IVPB      ciprofloxacin   IVPB 400 milliGRAM(s) IV Intermittent every 12 hours  enoxaparin Injectable 40 milliGRAM(s) SubCutaneous daily  influenza   Vaccine 0.5 milliLiter(s) IntraMuscular once  lactated ringers. 1000 milliLiter(s) (125 mL/Hr) IV Continuous <Continuous>  levothyroxine 200 MICROGram(s) Oral daily  metroNIDAZOLE  IVPB 500 milliGRAM(s) IV Intermittent every 8 hours  predniSONE   Tablet 5 milliGRAM(s) Oral daily  tamsulosin 0.4 milliGRAM(s) Oral at bedtime    MEDICATIONS  (PRN):  HYDROmorphone  Injectable 1 milliGRAM(s) IV Push every 3 hours PRN Severe Pain (7 - 10)  ketorolac   Injectable 30 milliGRAM(s) IV Push every 8 hours PRN Moderate Pain (4 - 6)  ondansetron Injectable 4 milliGRAM(s) IV Push every 6 hours PRN Nausea and/or Vomiting  oxyCODONE    IR 30 milliGRAM(s) Oral every 4 hours PRN Moderate Pain (4 - 6)      Allergies    penicillin (Unknown)    Intolerances        Review of Systems:    General:  No wt loss, fevers, chills, night sweats,fatigue,   Eyes:  Good vision, no reported pain  ENT:  No sore throat, pain, runny nose, dysphagia  CV:  No pain, palpitations, hypo/hypertension  Resp:  No dyspnea, cough, tachypnea, wheezing  GI:  No pain, No nausea, No vomiting, No diarrhea, No constipation, No weight loss, No fever, No pruritis, No rectal bleeding, No melena, No dysphagia  :  No pain, bleeding, incontinence, nocturia  Muscle:  No pain, weakness  Neuro:  No weakness, tingling, memory problems  Psych:  No fatigue, insomnia, mood problems, depression  Endocrine:  No polyuria, polydypsia, cold/heat intolerance  Heme:  No petechiae, ecchymosis, easy bruisability  Skin:  No rash, tattoos, scars, edema      Vital Signs Last 24 Hrs  T(C): 37.2 (20 Dec 2017 12:32), Max: 37.2 (20 Dec 2017 12:32)  T(F): 99 (20 Dec 2017 12:32), Max: 99 (20 Dec 2017 12:32)  HR: 74 (20 Dec 2017 12:32) (56 - 91)  BP: 127/81 (20 Dec 2017 12:32) (114/70 - 139/75)  BP(mean): --  RR: 18 (20 Dec 2017 12:32) (15 - 18)  SpO2: 92% (20 Dec 2017 12:32) (92% - 100%)    PHYSICAL EXAM:    Constitutional: NAD, well-developed  HEENT: EOMI, throat clear  Neck: No LAD, supple  Respiratory: CTA and P  Cardiovascular: S1 and S2, RRR, no M  Gastrointestinal: BS+, soft, NT/ND, neg HSM,  Extremities: No peripheral edema, neg clubing, cyanosis  Vascular: 2+ peripheral pulses  Neurological: A/O x 3, no focal deficits  Psychiatric: Normal mood, normal affect  Skin: No rashes      LABS:                        13.2   8.3   )-----------( 262      ( 20 Dec 2017 07:32 )             42.0     12-20    137  |  103  |  20  ----------------------------<  130<H>  3.7   |  29  |  1.30    Ca    8.9      20 Dec 2017 07:32  Mg     2.1     12-20    TPro  8.0  /  Alb  2.7<L>  /  TBili  4.2<H>  /  DBili  3.40<H>  /  AST  657<H>  /  ALT  476<H>  /  AlkPhos  487<H>  12-20    PT/INR - ( 20 Dec 2017 07:32 )   PT: 11.8 sec;   INR: 1.08 ratio         PTT - ( 20 Dec 2017 07:32 )  PTT:32.2 sec      RADIOLOGY & ADDITIONAL TESTS:
Interval Events:s/p ERCP with stone extraction     MEDICATIONS  (STANDING):  amitriptyline 100 milliGRAM(s) Oral at bedtime  ciprofloxacin   IVPB      ciprofloxacin   IVPB 400 milliGRAM(s) IV Intermittent every 12 hours  enoxaparin Injectable 40 milliGRAM(s) SubCutaneous daily  influenza   Vaccine 0.5 milliLiter(s) IntraMuscular once  lactated ringers. 1000 milliLiter(s) (90 mL/Hr) IV Continuous <Continuous>  levothyroxine 200 MICROGram(s) Oral daily  metroNIDAZOLE  IVPB 500 milliGRAM(s) IV Intermittent every 8 hours  predniSONE   Tablet 5 milliGRAM(s) Oral daily  tamsulosin 0.4 milliGRAM(s) Oral at bedtime    MEDICATIONS  (PRN):  HYDROmorphone  Injectable 1 milliGRAM(s) IV Push every 3 hours PRN Severe Pain (7 - 10)  ketorolac   Injectable 30 milliGRAM(s) IV Push every 8 hours PRN Moderate Pain (4 - 6)  ondansetron Injectable 4 milliGRAM(s) IV Push every 6 hours PRN Nausea and/or Vomiting  oxyCODONE    IR 30 milliGRAM(s) Oral every 4 hours PRN Moderate Pain (4 - 6)      Allergies    penicillin (Unknown)    Intolerances        Review of Systems:    General:  No wt loss, fevers, chills, night sweats,fatigue,   Eyes:  Good vision, no reported pain  ENT:  No sore throat, pain, runny nose, dysphagia  CV:  No pain, palpitations, hypo/hypertension  Resp:  No dyspnea, cough, tachypnea, wheezing  GI:  No pain, No nausea, No vomiting, No diarrhea, No constipation, No weight loss, No fever, No pruritis, No rectal bleeding, No melena, No dysphagia  :  No pain, bleeding, incontinence, nocturia  Muscle:  No pain, weakness  Neuro:  No weakness, tingling, memory problems  Psych:  No fatigue, insomnia, mood problems, depression  Endocrine:  No polyuria, polydypsia, cold/heat intolerance  Heme:  No petechiae, ecchymosis, easy bruisability  Skin:  No rash, tattoos, scars, edema      Vital Signs Last 24 Hrs  T(C): 36.5 (22 Dec 2017 11:36), Max: 37 (21 Dec 2017 13:52)  T(F): 97.7 (22 Dec 2017 11:36), Max: 98.6 (21 Dec 2017 13:52)  HR: 67 (22 Dec 2017 11:36) (64 - 77)  BP: 160/95 (22 Dec 2017 11:36) (120/74 - 168/90)  BP(mean): --  RR: 18 (22 Dec 2017 11:36) (15 - 18)  SpO2: 95% (22 Dec 2017 11:36) (94% - 99%)    PHYSICAL EXAM:    Constitutional: NAD, well-developed  HEENT: EOMI, throat clear  Neck: No LAD, supple  Respiratory: CTA and P  Cardiovascular: S1 and S2, RRR, no M  Gastrointestinal: BS+, soft, NT/ND, neg HSM,  Extremities: No peripheral edema, neg clubing, cyanosis  Vascular: 2+ peripheral pulses  Neurological: A/O x 3, no focal deficits  Psychiatric: Normal mood, normal affect  Skin: No rashes      LABS:            Urinalysis Basic - ( 20 Dec 2017 20:44 )    Color: Yellow / Appearance: Clear / S.005 / pH: x  Gluc: x / Ketone: Negative  / Bili: Small / Urobili: 8   Blood: x / Protein: 25 mg/dL / Nitrite: Negative   Leuk Esterase: Moderate / RBC: 11-25 /HPF / WBC >50   Sq Epi: x / Non Sq Epi: Occasional / Bacteria: Few        RADIOLOGY & ADDITIONAL TESTS:
[facility] Cardiology    CHIEF COMPLAINT: Patient is a 57y old  Female who presents with a chief complaint of R flank pain, elevated LFTs (20 Dec 2017 11:31)      Follow Up: [ ] Chest Pain      [ ] Dyspnea     [ ] Palpitations    [ ] Atrial Fibrillation     [ ] Ventricular Dysrhythmia    [ ] Abnormal EKG                      [ ] Abnormal Cardiac Enzymes     [ ] Valvular Disease    HPI:  57 y.o. F with Morbid obesity and chronic arthritis on high dose narcotics, who I did a lap alize on 6 months ago, presented to the ER today c/o RUQ/R flank pain which began overnight with N/V.  She had eaten cauliflower the night before.  She denies change in bowel habits.  She denies fever/chills.  She does admit to sore throat/nasal congestion for the past few days.  Now she also admits to some L flank pain. (19 Dec 2017 14:46)  Elevated LFTs.  For ERCP ttoday      Cardiac hx:  Acute IWMI in 2005 with rescue RCA PTCA   Cardiac cath 2010 EF 55%.  LM normal, LAD 30%.  CFX normal.  RCA patent stent.  Neg perfusion stress test 2013.    PAST MEDICAL & SURGICAL HISTORY:  Kidney stones  Carpal tunnel syndrome  Neuropathy  Scoliosis  Migraine  Hypothyroid  Fibromyalgia  MI (myocardial infarction)  Osteoarthritis  Rheumatoid arthritis  History of laparoscopic cholecystectomy      MEDICATIONS  (STANDING):  amitriptyline 100 milliGRAM(s) Oral at bedtime  ciprofloxacin   IVPB      ciprofloxacin   IVPB 400 milliGRAM(s) IV Intermittent every 12 hours  enoxaparin Injectable 40 milliGRAM(s) SubCutaneous daily  influenza   Vaccine 0.5 milliLiter(s) IntraMuscular once  lactated ringers. 1000 milliLiter(s) (90 mL/Hr) IV Continuous <Continuous>  levothyroxine 200 MICROGram(s) Oral daily  metroNIDAZOLE  IVPB 500 milliGRAM(s) IV Intermittent every 8 hours  predniSONE   Tablet 5 milliGRAM(s) Oral daily  tamsulosin 0.4 milliGRAM(s) Oral at bedtime    MEDICATIONS  (PRN):  HYDROmorphone  Injectable 1 milliGRAM(s) IV Push every 3 hours PRN Severe Pain (7 - 10)  ketorolac   Injectable 30 milliGRAM(s) IV Push every 8 hours PRN Moderate Pain (4 - 6)  ondansetron Injectable 4 milliGRAM(s) IV Push every 6 hours PRN Nausea and/or Vomiting  oxyCODONE    IR 30 milliGRAM(s) Oral every 4 hours PRN Moderate Pain (4 - 6)      Allergies    penicillin (Unknown)    Intolerances        REVIEW OF SYSTEMS:    CONSTITUTIONAL: No weakness, fevers or chills.   EYES/ENT: No visual changes;  No vertigo or throat pain   NECK: No pain or stiffness  RESPIRATORY: No cough, wheezing, hemoptysis; No shortness of breath  CARDIOVASCULAR: No chest pain or palpitations  GASTROINTESTINAL: abdominal pain   GENITOURINARY: No dysuria, frequency or hematuria  NEUROLOGICAL: No numbness or weakness  SKIN: No itching, burning, rashes, or lesions   All other review of systems is negative unless indicated above    Vital Signs Last 24 Hrs  T(C): 37 (21 Dec 2017 13:52), Max: 37.1 (21 Dec 2017 07:30)  T(F): 98.6 (21 Dec 2017 13:52), Max: 98.7 (21 Dec 2017 07:30)  HR: 67 (21 Dec 2017 13:52) (62 - 70)  BP: 155/85 (21 Dec 2017 13:52) (95/58 - 155/85)  BP(mean): --  RR: 16 (21 Dec 2017 13:52) (16 - 18)  SpO2: 95% (21 Dec 2017 13:52) (92% - 95%)    I&O's Summary    20 Dec 2017 07:01  -  21 Dec 2017 07:00  --------------------------------------------------------  IN: 3785 mL / OUT: 600 mL / NET: 3185 mL    21 Dec 2017 07:01  -  21 Dec 2017 14:08  --------------------------------------------------------  IN: 360 mL / OUT: 0 mL / NET: 360 mL        PHYSICAL EXAM:    Constitutional: NAD, awake, alert  Neurological: Alert, no focal deficits  HEENT: no JVD, EOMI  Cardiovascular: Regular, S1 and S2, no murmur  Pulmonary: breath sounds bilaterally CL  Gastrointestinal: Bowel Sounds present, soft, nontender  EXT:  peripheral edema, none  Skin: No rashes.  Psych:  Mood & affect appropriate    LABS: All Labs Reviewed:                        13.2   8.3   )-----------( 262      ( 20 Dec 2017 07:32 )             42.0     12-20    137  |  103  |  20  ----------------------------<  130<H>  3.7   |  29  |  1.30    Ca    8.9      20 Dec 2017 07:32  Mg     2.1     12-20    TPro  8.0  /  Alb  2.7<L>  /  TBili  4.2<H>  /  DBili  3.40<H>  /  AST  657<H>  /  ALT  476<H>  /  AlkPhos  487<H>  12-20    PT/INR - ( 20 Dec 2017 07:32 )   PT: 11.8 sec;   INR: 1.08 ratio         PTT - ( 20 Dec 2017 07:32 )  PTT:32.2 sec      Blood Culture:     12-19 @ 12:04  TSH: 94.30    · Assessment		  57 year old female who presents with RLQ abdominal pain radiating to the flank, nausea and vomiting.       Elevated transaminase level.   - s/p MRCP, awaiting ERCP     H/o remote IWMI and RCA stent 2005.    EKG: Inferior q waves, old  ECHO: TDS but suggests preserved LV EF 55%    Pt denies any CP on exertion or at rest in weeks leading up to this admission    No cardiac contraindication to OR/ERCP  Will follow in hospital  Pt should be on low dose aspirin.  Pt should F/U with me outpt
The patient was interviewed and evaluated    57y Female    T(C): 36.7 (12-22-17 @ 07:12), Max: 37 (12-21-17 @ 13:52)  HR: 64 (12-22-17 @ 07:12) (62 - 77)  BP: 135/76 (12-22-17 @ 07:12) (116/75 - 168/90)  RR: 16 (12-22-17 @ 07:12) (15 - 18)  SpO2: 97% (12-22-17 @ 07:12) (94% - 99%)  Wt(kg): --    No Nausea/vomiting, recall, sore throat or headache.    No anesthesia related complaints or sequelae.    No additional recommendations.
[facility] Cardiology    CHIEF COMPLAINT: Patient is a 57y old  Female who presents with a chief complaint of R flank pain, elevated LFTs (20 Dec 2017 11:31)      Follow Up: [ ] Chest Pain      [ ] Dyspnea     [ ] Palpitations    [ ] Atrial Fibrillation     [ ] Ventricular Dysrhythmia    [ ] Abnormal EKG                      [ ] Abnormal Cardiac Enzymes     [ ] Valvular Disease    HPI:  57 y.o. F with Morbid obesity and chronic arthritis on high dose narcotics, who I did a lap alize on 6 months ago, presented to the ER today c/o RUQ/R flank pain which began overnight with N/V.  She had eaten cauliflower the night before.  She denies change in bowel habits.  She denies fever/chills.  She does admit to sore throat/nasal congestion for the past few days.  Now she also admits to some L flank pain. (19 Dec 2017 14:46)    Now s/p ERCP  Denies CP or SOB      PAST MEDICAL & SURGICAL HISTORY:  Kidney stones  Carpal tunnel syndrome  Neuropathy  Scoliosis  Migraine  Hypothyroid  Fibromyalgia  MI (myocardial infarction)  Osteoarthritis  Rheumatoid arthritis  History of laparoscopic cholecystectomy      MEDICATIONS  (STANDING):  amitriptyline 100 milliGRAM(s) Oral at bedtime  ciprofloxacin   IVPB      ciprofloxacin   IVPB 400 milliGRAM(s) IV Intermittent every 12 hours  enoxaparin Injectable 40 milliGRAM(s) SubCutaneous daily  influenza   Vaccine 0.5 milliLiter(s) IntraMuscular once  lactated ringers. 1000 milliLiter(s) (90 mL/Hr) IV Continuous <Continuous>  levothyroxine 200 MICROGram(s) Oral daily  metroNIDAZOLE  IVPB 500 milliGRAM(s) IV Intermittent every 8 hours  predniSONE   Tablet 5 milliGRAM(s) Oral daily  tamsulosin 0.4 milliGRAM(s) Oral at bedtime    MEDICATIONS  (PRN):  HYDROmorphone  Injectable 1 milliGRAM(s) IV Push every 3 hours PRN Severe Pain (7 - 10)  ketorolac   Injectable 30 milliGRAM(s) IV Push every 8 hours PRN Moderate Pain (4 - 6)  ondansetron Injectable 4 milliGRAM(s) IV Push every 6 hours PRN Nausea and/or Vomiting  oxyCODONE    IR 30 milliGRAM(s) Oral every 4 hours PRN Moderate Pain (4 - 6)      Allergies    penicillin (Unknown)    Intolerances        REVIEW OF SYSTEMS:    CONSTITUTIONAL: No weakness, fevers or chills.   EYES/ENT: No visual changes;  No vertigo or throat pain   NECK: No pain or stiffness  RESPIRATORY: No cough, wheezing, hemoptysis; No shortness of breath  CARDIOVASCULAR: No chest pain or palpitations  GASTROINTESTINAL: No abdominal or epigastric pain. No nausea, vomiting, or hematemesis; No diarrhea or constipation. No melena or hematochezia.  GENITOURINARY: No dysuria, frequency or hematuria  NEUROLOGICAL: No numbness or weakness  SKIN: No itching, burning, rashes, or lesions   All other review of systems is negative unless indicated above    Vital Signs Last 24 Hrs  T(C): 36.7 (22 Dec 2017 07:12), Max: 37 (21 Dec 2017 13:52)  T(F): 98.1 (22 Dec 2017 07:12), Max: 98.6 (21 Dec 2017 13:52)  HR: 64 (22 Dec 2017 07:12) (62 - 77)  BP: 135/76 (22 Dec 2017 07:12) (116/75 - 168/90)  BP(mean): --  RR: 16 (22 Dec 2017 07:12) (15 - 18)  SpO2: 97% (22 Dec 2017 07:12) (94% - 99%)    I&O's Summary    21 Dec 2017 07:01  -  22 Dec 2017 07:00  --------------------------------------------------------  IN: 2250 mL / OUT: 0 mL / NET: 2250 mL        PHYSICAL EXAM:  Constitutional: NAD, awake, alert  Neurological: Alert, no focal deficits  HEENT: no JVD, EOMI  Cardiovascular: Regular, S1 and S2, no murmur  Pulmonary: breath sounds bilaterally CL  Gastrointestinal: Bowel Sounds present, soft, nontender  EXT:  peripheral edema, none  Skin: No rashes.  Psych:  Mood & affect appropriate    LABS: All Labs Reviewed:                Blood Culture: Organism --  Gram Stain Blood -- Gram Stain --  Specimen Source .Urine Clean Catch (Midstream)  Culture-Blood --        12-19 @ 12:04  TSH: 94.30    57 year old female obesity CAD who presents with RLQ abdominal pain radiating to the flank, nausea and vomiting.     Elevated transaminase level.   - s/p MRCP & ERCP     H/o remote IWMI and RCA stent 2005.    EKG: Inferior q waves, old  ECHO: TDS but suggests preserved LV EF 55%    Pt denies any CP or SOB today  Pt should be on low dose aspirin.  Pt will F/U with me outpt, [phone]
afebrile  no lt flank pain  urine culture negative   s/p ercp claims stone removed  understands need for follow up of distal left ureteral stone- on flomax and straining urine
expected to have ERCP today for common duct stone  discussed left ureteral stone with her and Dr Montes  she will follow up with her urologist and continue flomax and straining urine
hx right sided abdomen pain  has right non obstructing renal stones and left distal ureteral stone  has elevated lft's with retained stone in common duct.   is on flomax and straining urine   plan for ercp according to staff
Date/Time Patient Seen:  		  Referring MD:   Data Reviewed	       Patient is a 57y old  Female who presents with a chief complaint of R flank pain, elevated LFTs (20 Dec 2017 11:31)  in bed  seen and examined  vs and meds reviewed  MRI noted      Subjective/HPI     PAST MEDICAL & SURGICAL HISTORY:  Kidney stones  Carpal tunnel syndrome  Neuropathy  Scoliosis  Migraine  Hypothyroid  Fibromyalgia  MI (myocardial infarction)  Osteoarthritis  Rheumatoid arthritis  History of laparoscopic cholecystectomy        Medication list         MEDICATIONS  (STANDING):  amitriptyline 100 milliGRAM(s) Oral at bedtime  ciprofloxacin   IVPB      ciprofloxacin   IVPB 400 milliGRAM(s) IV Intermittent every 12 hours  enoxaparin Injectable 40 milliGRAM(s) SubCutaneous daily  influenza   Vaccine 0.5 milliLiter(s) IntraMuscular once  lactated ringers. 1000 milliLiter(s) (90 mL/Hr) IV Continuous <Continuous>  levothyroxine 200 MICROGram(s) Oral daily  metroNIDAZOLE  IVPB 500 milliGRAM(s) IV Intermittent every 8 hours  predniSONE   Tablet 5 milliGRAM(s) Oral daily  tamsulosin 0.4 milliGRAM(s) Oral at bedtime    MEDICATIONS  (PRN):  HYDROmorphone  Injectable 1 milliGRAM(s) IV Push every 3 hours PRN Severe Pain (7 - 10)  ketorolac   Injectable 30 milliGRAM(s) IV Push every 8 hours PRN Moderate Pain (4 - 6)  ondansetron Injectable 4 milliGRAM(s) IV Push every 6 hours PRN Nausea and/or Vomiting  oxyCODONE    IR 30 milliGRAM(s) Oral every 4 hours PRN Moderate Pain (4 - 6)         Vitals log        ICU Vital Signs Last 24 Hrs  T(C): 36.9 (21 Dec 2017 05:16), Max: 37.2 (20 Dec 2017 12:32)  T(F): 98.5 (21 Dec 2017 05:16), Max: 99 (20 Dec 2017 12:32)  HR: 64 (21 Dec 2017 05:16) (60 - 74)  BP: 136/71 (21 Dec 2017 05:16) (95/58 - 138/79)  BP(mean): --  ABP: --  ABP(mean): --  RR: 17 (21 Dec 2017 05:16) (16 - 18)  SpO2: 95% (21 Dec 2017 05:16) (92% - 100%)           Input and Output:  I&O's Detail    19 Dec 2017 07:01  -  20 Dec 2017 07:00  --------------------------------------------------------  IN:    lactated ringers.: 1250 mL    Oral Fluid: 240 mL    Solution: 200 mL    Solution: 200 mL  Total IN: 1890 mL    OUT:    Voided: 1250 mL  Total OUT: 1250 mL    Total NET: 640 mL      20 Dec 2017 07:01  -  21 Dec 2017 06:15  --------------------------------------------------------  IN:    lactated ringers.: 1500 mL    Oral Fluid: 120 mL    Solution: 200 mL    Solution: 200 mL  Total IN: 2020 mL    OUT:    Voided: 600 mL  Total OUT: 600 mL    Total NET: 1420 mL          Lab Data                        13.2   8.3   )-----------( 262      ( 20 Dec 2017 07:32 )             42.0     12-20    137  |  103  |  20  ----------------------------<  130<H>  3.7   |  29  |  1.30    Ca    8.9      20 Dec 2017 07:32  Mg     2.1     12-20    TPro  8.0  /  Alb  2.7<L>  /  TBili  4.2<H>  /  DBili  3.40<H>  /  AST  657<H>  /  ALT  476<H>  /  AlkPhos  487<H>  12-20            Review of Systems	      Objective     Physical Examination  obese  head at  heart s1s2  lungs dec BS  abd dec BS  cn grossly int        Pertinent Lab findings & Imaging      Hossein:  NO   Adequate UO     I&O's Detail    19 Dec 2017 07:01  -  20 Dec 2017 07:00  --------------------------------------------------------  IN:    lactated ringers.: 1250 mL    Oral Fluid: 240 mL    Solution: 200 mL    Solution: 200 mL  Total IN: 1890 mL    OUT:    Voided: 1250 mL  Total OUT: 1250 mL    Total NET: 640 mL      20 Dec 2017 07:01  -  21 Dec 2017 06:15  --------------------------------------------------------  IN:    lactated ringers.: 1500 mL    Oral Fluid: 120 mL    Solution: 200 mL    Solution: 200 mL  Total IN: 2020 mL    OUT:    Voided: 600 mL  Total OUT: 600 mL    Total NET: 1420 mL               Discussed with:     Cultures:	        Radiology

## 2017-12-22 NOTE — PROGRESS NOTE ADULT - ASSESSMENT
Elevated LFTs, r/o retained stone  L ureteral stone  Poss passed R ureteral stone
57 year old female who presented with RLQ abdominal pain found to have CBD stone
57 year old female who presented with RLQ abdominal pain found to have CBD stone
Choledocholithiasis; L ureteral stone
s/p ERCP removal of CBD stone, doing well

## 2017-12-22 NOTE — PROGRESS NOTE ADULT - PROBLEM SELECTOR PROBLEM 4
Atelectasis
Coronary artery disease involving native heart without angina pectoris, unspecified vessel or lesion type
Coronary artery disease involving native heart without angina pectoris, unspecified vessel or lesion type
Rheumatoid arthritis
Hepatitis C

## 2017-12-22 NOTE — PROGRESS NOTE ADULT - PROBLEM SELECTOR PLAN 4
I mary  mobilize as tolerated  increase activity  monitor sat  keep sat > 88 pct
cardio eval noted  TTE noted  supportive care  dietary discretion
f/u with Card
on chronic prednisone  monitor for sx  supportive measures
f/u genotype and Hep C viral load

## 2017-12-22 NOTE — PROGRESS NOTE ADULT - PROBLEM SELECTOR PLAN 2
KAYLIN  OHS  enc use of NIPPV or o2 at night time to keep sat > 88 pct  I mary
I mary  mobilize  out of bed  I mary  keep sat > 88 pct
RA  supportive management  pain control  out of bed
f/u with Dr. Janet de leon
Cont flomax.  f/u with Dr. Wright outpt
secondary to choledocholithiasis   ERCP in am  monitor LFTs
secondary to choledocholithiasis   ERCP in am  monitor LFTs

## 2017-12-22 NOTE — PROGRESS NOTE ADULT - PROBLEM SELECTOR PLAN 3
pain assessment and treatment  weight loss rec.
GI following  MRI pending  am labs pending
Patient being referred for output bariatric consult
cont synthroid  supportive medical regimen
has right non obstructing renal stones and left distal ureteral stone  continue flomax and straining urine  Urology input appreciated
has right non obstructing renal stones and left distal ureteral stone  continue flomax and straining urine  Urology input appreciated

## 2017-12-22 NOTE — PROGRESS NOTE ADULT - PROBLEM SELECTOR PROBLEM 2
Atelectasis
Kidney stones
Rheumatoid arthritis involving knee, unspecified laterality, unspecified rheumatoid factor presence
Elevated transaminase level
Elevated transaminase level
Ureterolithiasis
Morbid obesity

## 2017-12-22 NOTE — PROGRESS NOTE ADULT - PROVIDER SPECIALTY LIST ADULT
Anesthesia
Cardiology
Cardiology
Gastroenterology
Gastroenterology
Pulmonology
Pulmonology
Surgery
Surgery
Urology
Pulmonology
Surgery

## 2017-12-22 NOTE — PROGRESS NOTE ADULT - PROBLEM SELECTOR PROBLEM 1
Choledocholithiasis
LFT elevation
Morbid obesity
Choledocholithiasis

## 2017-12-27 ENCOUNTER — TRANSCRIPTION ENCOUNTER (OUTPATIENT)
Age: 57
End: 2017-12-27

## 2017-12-28 LAB
AMPHET UR-MCNC: NEGATIVE — SIGNIFICANT CHANGE UP
BARBITURATES, URINE.: NEGATIVE — SIGNIFICANT CHANGE UP
BENZODIAZ UR-MCNC: NEGATIVE — SIGNIFICANT CHANGE UP
COCAINE METAB.OTHER UR-MCNC: NEGATIVE — SIGNIFICANT CHANGE UP
CREATININE, URINE THERAPEUTIC: 111.5 MG/DL — SIGNIFICANT CHANGE UP
METHADONE UR-MCNC: NEGATIVE — SIGNIFICANT CHANGE UP
METHAQUALONE UR QL: NEGATIVE — SIGNIFICANT CHANGE UP
METHAQUALONE UR-MCNC: NEGATIVE — SIGNIFICANT CHANGE UP
OPIATES UR-MCNC: SIGNIFICANT CHANGE UP
PCP UR-MCNC: NEGATIVE — SIGNIFICANT CHANGE UP
PROPOXYPH UR QL: NEGATIVE — SIGNIFICANT CHANGE UP
THC UR QL: NEGATIVE — SIGNIFICANT CHANGE UP
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
## 2021-11-08 NOTE — ED ADULT NURSE NOTE - RESPIRATORY ASSESSMENT
Detail Level: Generalized
Detail Level: Zone
Quality 337: Tuberculosis Prevention For Psoriasis And Psoriatic Arthritis Patients On A Biological Immune Response Modifier: No documentation of negative or managed positive TB screen
WDL

## 2023-10-10 NOTE — DISCHARGE NOTE ADULT - NSTOBACCOHOTLINE_GEN_A_CS
[facility] Smokers Quitline ([id number]) [facility] Smokers Quitline ([id number]) Complex Repair And Graft Additional Text (Will Appearing After The Standard Complex Repair Text): The complex repair was not sufficient to completely close the primary defect. The remaining additional defect was repaired with the graft mentioned below.

## 2024-03-19 NOTE — PATIENT PROFILE ADULT. - EXTENSIONS OF SELF_ADULT
Discharge orders received. Discharge instructions provided to pt. Pt verbalized understanding of all instructions. Ask pt about ride home. Per pt ride would not be available until 6pm. All PIV removed. Belongings packed and with pt. Pt safely discharged off unit via EPD.    /Dentures/Eyeglasses
